# Patient Record
Sex: MALE | Race: WHITE | NOT HISPANIC OR LATINO | URBAN - METROPOLITAN AREA
[De-identification: names, ages, dates, MRNs, and addresses within clinical notes are randomized per-mention and may not be internally consistent; named-entity substitution may affect disease eponyms.]

---

## 2023-01-26 PROBLEM — I10 BENIGN ESSENTIAL HYPERTENSION: Status: ACTIVE | Noted: 2023-01-26

## 2023-01-26 PROBLEM — E66.9 CLASS 1 OBESITY WITH BODY MASS INDEX (BMI) OF 31.0 TO 31.9 IN ADULT: Status: ACTIVE | Noted: 2023-01-26

## 2023-01-26 PROBLEM — E78.2 HYPERLIPIDEMIA, MIXED: Status: ACTIVE | Noted: 2023-01-26

## 2023-01-26 PROBLEM — D03.9: Status: ACTIVE | Noted: 2023-01-26

## 2023-01-26 PROBLEM — E66.811 CLASS 1 OBESITY WITH BODY MASS INDEX (BMI) OF 31.0 TO 31.9 IN ADULT: Status: ACTIVE | Noted: 2023-01-26

## 2023-01-26 PROBLEM — R80.9 PROTEINURIA: Status: ACTIVE | Noted: 2023-01-26

## 2023-01-26 RX ORDER — CHOLECALCIFEROL (VITAMIN D3) 25 MCG
TABLET ORAL
COMMUNITY
Start: 2021-02-01

## 2023-01-26 RX ORDER — TRANDOLAPRIL 1 MG/1
1 TABLET ORAL EVERY EVENING
COMMUNITY
Start: 2016-10-10 | End: 2023-11-17 | Stop reason: SDUPTHER

## 2023-01-26 RX ORDER — ROSUVASTATIN CALCIUM 20 MG/1
1 TABLET, COATED ORAL DAILY
COMMUNITY
Start: 2018-06-19 | End: 2023-11-17 | Stop reason: SDUPTHER

## 2023-01-26 RX ORDER — FOLIC ACID 0.4 MG
TABLET ORAL
COMMUNITY
Start: 2016-06-07

## 2023-01-26 RX ORDER — ASPIRIN 81 MG/1
1 TABLET ORAL DAILY
COMMUNITY
Start: 2014-07-08

## 2023-01-26 RX ORDER — VALACYCLOVIR HYDROCHLORIDE 1 G/1
1 TABLET, FILM COATED ORAL DAILY
COMMUNITY
Start: 2022-08-25 | End: 2023-02-03

## 2023-01-26 RX ORDER — MULTIVIT-MIN/IRON/FOLIC ACID/K 18-600-40
CAPSULE ORAL
COMMUNITY
Start: 2021-02-01

## 2023-01-26 RX ORDER — EPINEPHRINE 0.22MG
AEROSOL WITH ADAPTER (ML) INHALATION
COMMUNITY
Start: 2021-02-01

## 2023-02-03 RX ORDER — MULTIVITAMIN
1 TABLET ORAL DAILY
COMMUNITY

## 2023-03-09 ENCOUNTER — OFFICE VISIT (OUTPATIENT)
Dept: PRIMARY CARE | Facility: CLINIC | Age: 53
End: 2023-03-09
Payer: COMMERCIAL

## 2023-03-09 ENCOUNTER — APPOINTMENT (OUTPATIENT)
Dept: LAB | Facility: LAB | Age: 53
End: 2023-03-09
Payer: COMMERCIAL

## 2023-03-09 VITALS
HEIGHT: 68 IN | HEART RATE: 61 BPM | DIASTOLIC BLOOD PRESSURE: 71 MMHG | TEMPERATURE: 97.3 F | OXYGEN SATURATION: 97 % | BODY MASS INDEX: 30.01 KG/M2 | SYSTOLIC BLOOD PRESSURE: 114 MMHG | WEIGHT: 198 LBS

## 2023-03-09 DIAGNOSIS — R80.1 PERSISTENT PROTEINURIA: ICD-10-CM

## 2023-03-09 DIAGNOSIS — E66.09 CLASS 1 OBESITY DUE TO EXCESS CALORIES WITHOUT SERIOUS COMORBIDITY WITH BODY MASS INDEX (BMI) OF 31.0 TO 31.9 IN ADULT: ICD-10-CM

## 2023-03-09 DIAGNOSIS — D03.4 MELANOMA IN SITU OF SCALP (MULTI): ICD-10-CM

## 2023-03-09 DIAGNOSIS — E78.2 HYPERLIPIDEMIA, MIXED: ICD-10-CM

## 2023-03-09 DIAGNOSIS — I10 BENIGN ESSENTIAL HYPERTENSION: Primary | ICD-10-CM

## 2023-03-09 PROCEDURE — 1036F TOBACCO NON-USER: CPT | Performed by: INTERNAL MEDICINE

## 2023-03-09 PROCEDURE — 3078F DIAST BP <80 MM HG: CPT | Performed by: INTERNAL MEDICINE

## 2023-03-09 PROCEDURE — 99212 OFFICE O/P EST SF 10 MIN: CPT | Performed by: INTERNAL MEDICINE

## 2023-03-09 PROCEDURE — 3008F BODY MASS INDEX DOCD: CPT | Performed by: INTERNAL MEDICINE

## 2023-03-09 PROCEDURE — 83036 HEMOGLOBIN GLYCOSYLATED A1C: CPT

## 2023-03-09 PROCEDURE — 36415 COLL VENOUS BLD VENIPUNCTURE: CPT

## 2023-03-09 PROCEDURE — 80061 LIPID PANEL: CPT

## 2023-03-09 PROCEDURE — 3074F SYST BP LT 130 MM HG: CPT | Performed by: INTERNAL MEDICINE

## 2023-03-09 PROCEDURE — 80053 COMPREHEN METABOLIC PANEL: CPT

## 2023-03-09 ASSESSMENT — ENCOUNTER SYMPTOMS
FATIGUE: 0
COUGH: 0
NAUSEA: 0
DIARRHEA: 0
VOMITING: 0
SORE THROAT: 0
FEVER: 0
WHEEZING: 0
CHILLS: 0
PALPITATIONS: 0
SHORTNESS OF BREATH: 0
CONSTIPATION: 0

## 2023-03-09 NOTE — PROGRESS NOTES
Subjective   Patient ID: Alcon Covarrubias is a 52 y.o. male who presents for Establish Care (4 month Follow up).    Assessment/Plan   Problem List Items Addressed This Visit          Circulatory    Benign essential hypertension - Primary     Advised continue aspirin vitamin C vitamin D Mavik 1 mg a day watch for potassium         Relevant Orders    Comprehensive Metabolic Panel    Hemoglobin A1C    Lipid Panel       Endocrine/Metabolic    Class 1 obesity with body mass index (BMI) of 31.0 to 31.9 in adult     Diet exercise sleep apnea test follow-up         Relevant Orders    Comprehensive Metabolic Panel    Hemoglobin A1C    Lipid Panel       Other    Hyperlipidemia, mixed     Low-fat diet exercise Crestor 20 mg a day check LFT CPK         Relevant Orders    Comprehensive Metabolic Panel    Hemoglobin A1C    Lipid Panel    Melanoma in situ, unspecified (CMS/HCC)     Once a year dermatology evaluation         Relevant Orders    Comprehensive Metabolic Panel    Hemoglobin A1C    Lipid Panel    Proteinuria     Low-salt low-protein diet         Relevant Orders    Comprehensive Metabolic Panel    Hemoglobin A1C    Lipid Panel     Family history of heart disease and colon cancer advised to follow-up with the cardiologist and gastroenterologist sent for the blood test  Source of history: Nurse, Medical personnel, Medical record, Patient.  History limitation: None.    HPI history of hypertension hyperlipidemia obesity proteinuria    No headache no chest pain no hematuria rectal bleeding negative for COVID-19    No Known Allergies    Current Outpatient Medications   Medication Sig Dispense Refill    ascorbic acid, vitamin C, 500 mg capsule Vitamin C 500 MG Oral Capsule   Refills: 0        [facility] GREGG, Mark   Start : 1-Feb-2021   Active      aspirin 81 mg EC tablet Take 1 tablet (81 mg) by mouth once daily.      cholecalciferol (Vitamin D-3) 25 MCG (1000 UT) tablet Vitamin D3 25 MCG (1000 UT) Oral Tablet   Refills: 0       "  Mark Petersen MD   Start : 1-Feb-2021   Active      coenzyme Q-10 100 mg capsule CoQ10 100 MG Oral Capsule   Refills: 0        Mark Petersen MD   Start : 1-Feb-2021   Active      folic acid (Folvite) 400 mcg tablet Folic Acid 400 MCG Oral Tablet   Refills: 0        Mark Petersen MD   Start : 7-Jun-2016   Active      multivitamin tablet Take 1 tablet by mouth once daily.      omega-3/dha/epa/fish oil (OMEGA-3 ORAL) MegaRed Omega-3 Krill Oil 350 MG Oral Capsule      rosuvastatin (Crestor) 20 mg tablet Take 1 tablet (20 mg) by mouth once daily. In the pm      trandolapril (Mavik) 1 mg tablet Take 1 tablet (1 mg) by mouth once daily in the evening. In the pm       No current facility-administered medications for this visit.       Objective   Visit Vitals  /71 (BP Location: Left arm, Patient Position: Sitting, BP Cuff Size: Large adult)   Pulse 61   Temp 36.3 °C (97.3 °F)   Ht 1.727 m (5' 8\")   Wt 89.8 kg (198 lb)   SpO2 97%   BMI 30.11 kg/m²   Smoking Status Former   BSA 2.08 m²     Physical Exam  Constitutional:       General: He is not in acute distress.     Appearance: Normal appearance.   HENT:      Head: Normocephalic and atraumatic.      Nose: Nose normal.   Eyes:      Extraocular Movements: Extraocular movements intact.      Conjunctiva/sclera: Conjunctivae normal.   Cardiovascular:      Rate and Rhythm: Normal rate and regular rhythm.   Pulmonary:      Effort: Pulmonary effort is normal.      Breath sounds: Normal breath sounds.   Skin:     General: Skin is warm.   Neurological:      Mental Status: He is alert and oriented to person, place, and time.   Psychiatric:         Mood and Affect: Mood normal.         Behavior: Behavior normal.       Review of Systems   Constitutional:  Negative for chills, fatigue and fever.   HENT:  Negative for congestion, ear pain and sore throat.    Respiratory:  Negative for cough, shortness of breath and wheezing.    Cardiovascular:  Negative for chest pain, " palpitations and leg swelling.   Gastrointestinal:  Negative for constipation, diarrhea, nausea and vomiting.       No visits with results within 4 Month(s) from this visit.   Latest known visit with results is:   [facility] Encounter on 11/08/2022   Component Date Value Ref Range Status    Color, Urine 11/08/2022 IRMA  STRAW,YELLOW Final    Appearance, Urine 11/08/2022 HAZY  CLEAR Final    Specific Gravity, Urine 11/08/2022 1.026  1.005 - 1.035 Final    pH, Urine 11/08/2022 5.0  5.0 - 8.0 Final    Protein, Urine 11/08/2022 NEGATIVE  NEGATIVE mg/dL Final    Glucose, Urine 11/08/2022 NEGATIVE  NEGATIVE mg/dL Final    Blood, Urine 11/08/2022 NEGATIVE  NEGATIVE Final    Ketones, Urine 11/08/2022 NEGATIVE  NEGATIVE mg/dL Final    Bilirubin, Urine 11/08/2022 NEGATIVE  NEGATIVE Final    Urobilinogen, Urine 11/08/2022 <2.0  0.0 - 1.9 mg/dL Final    Nitrite, Urine 11/08/2022 NEGATIVE  NEGATIVE Final    Leukocyte Esterase, Urine 11/08/2022 NEGATIVE  NEGATIVE Final       Radiology: Reviewed imaging in powerchart.  No results found.    Family History   Problem Relation Name Age of Onset    Liver cancer Father      Kidney failure Father      Other (cardiac disorder) Father       Social History     Socioeconomic History    Marital status:      Spouse name: None    Number of children: None    Years of education: None    Highest education level: None   Occupational History    None   Tobacco Use    Smoking status: Former     Types: Cigarettes    Smokeless tobacco: Never   Vaping Use    Vaping status: None   Substance and Sexual Activity    Alcohol use: Yes     Alcohol/week: 1.0 standard drink of alcohol     Types: 1 Shots of liquor per week    Drug use: Never    Sexual activity: None   Other Topics Concern    None   Social History Narrative    None     Social Determinants of Health     Financial Resource Strain: Not on file   Food Insecurity: Not on file   Transportation Needs: Not on file   Physical Activity: Not on file    Stress: Not on file   Social Connections: Not on file   Intimate Partner Violence: Not on file   Housing Stability: Not on file     Past Medical History:   Diagnosis Date    Abnormal levels of other serum enzymes 06/19/2018    Elevated CPK    Body mass index (BMI) 32.0-32.9, adult 03/17/2022    BMI 32.0-32.9,adult    Contact with and (suspected) exposure to covid-19 12/03/2020    COVID-19 ruled out by laboratory testing    COVID-19 07/12/2021    Lab test positive for detection of COVID-19 virus    Encounter for screening for malignant neoplasm of colon 08/25/2022    Screen for colon cancer    Encounter for screening for malignant neoplasm of rectum 08/25/2022    Screening for rectal cancer    Encounter for screening for malignant neoplasm of respiratory organs 08/25/2022    Encounter for screening for lung cancer    Other chest pain 05/16/2019    Atypical chest pain    Other long term (current) drug therapy 06/15/2020    Long term use of drug    Otitis media, unspecified, unspecified ear 07/08/2015    Ear infection    Pain in left shoulder 02/26/2018    Chronic left shoulder pain    Periapical abscess without sinus 03/13/2020    Tooth infection    Personal history of malignant melanoma of skin     History of malignant melanoma of skin    Personal history of other diseases of the musculoskeletal system and connective tissue 01/19/2015    History of low back pain    Personal history of other endocrine, nutritional and metabolic disease 06/15/2020    History of vitamin D deficiency    Personal history of other endocrine, nutritional and metabolic disease 06/15/2020    History of morbid obesity    Personal history of other infectious and parasitic diseases 08/25/2022    History of herpes labialis    Personal history of urinary (tract) infections 07/16/2019    History of urinary tract infection     Past Surgical History:   Procedure Laterality Date    APPENDECTOMY  07/08/2014    Appendectomy       Charting was  completed using voice recognition technology and may include unintended errors.

## 2023-03-10 LAB
ALANINE AMINOTRANSFERASE (SGPT) (U/L) IN SER/PLAS: 22 U/L (ref 10–52)
ALBUMIN (G/DL) IN SER/PLAS: 4.4 G/DL (ref 3.4–5)
ALKALINE PHOSPHATASE (U/L) IN SER/PLAS: 56 U/L (ref 33–120)
ANION GAP IN SER/PLAS: 14 MMOL/L (ref 10–20)
ASPARTATE AMINOTRANSFERASE (SGOT) (U/L) IN SER/PLAS: 20 U/L (ref 9–39)
BILIRUBIN TOTAL (MG/DL) IN SER/PLAS: 0.8 MG/DL (ref 0–1.2)
CALCIUM (MG/DL) IN SER/PLAS: 9.3 MG/DL (ref 8.6–10.6)
CARBON DIOXIDE, TOTAL (MMOL/L) IN SER/PLAS: 26 MMOL/L (ref 21–32)
CHLORIDE (MMOL/L) IN SER/PLAS: 105 MMOL/L (ref 98–107)
CHOLESTEROL (MG/DL) IN SER/PLAS: 155 MG/DL (ref 0–199)
CHOLESTEROL IN HDL (MG/DL) IN SER/PLAS: 41.5 MG/DL
CHOLESTEROL/HDL RATIO: 3.7
CREATININE (MG/DL) IN SER/PLAS: 1.14 MG/DL (ref 0.5–1.3)
ESTIMATED AVERAGE GLUCOSE FOR HBA1C: 108 MG/DL
GFR MALE: 77 ML/MIN/1.73M2
GLUCOSE (MG/DL) IN SER/PLAS: 93 MG/DL (ref 74–99)
HEMOGLOBIN A1C/HEMOGLOBIN TOTAL IN BLOOD: 5.4 %
LDL: 96 MG/DL (ref 0–99)
POTASSIUM (MMOL/L) IN SER/PLAS: 4.4 MMOL/L (ref 3.5–5.3)
PROTEIN TOTAL: 6.9 G/DL (ref 6.4–8.2)
SODIUM (MMOL/L) IN SER/PLAS: 141 MMOL/L (ref 136–145)
TRIGLYCERIDE (MG/DL) IN SER/PLAS: 88 MG/DL (ref 0–149)
UREA NITROGEN (MG/DL) IN SER/PLAS: 19 MG/DL (ref 6–23)
VLDL: 18 MG/DL (ref 0–40)

## 2023-07-14 ENCOUNTER — OFFICE VISIT (OUTPATIENT)
Dept: PRIMARY CARE | Facility: CLINIC | Age: 53
End: 2023-07-14
Payer: COMMERCIAL

## 2023-07-14 ENCOUNTER — LAB (OUTPATIENT)
Dept: LAB | Facility: LAB | Age: 53
End: 2023-07-14
Payer: COMMERCIAL

## 2023-07-14 VITALS
HEART RATE: 62 BPM | SYSTOLIC BLOOD PRESSURE: 120 MMHG | BODY MASS INDEX: 29.92 KG/M2 | WEIGHT: 197.4 LBS | DIASTOLIC BLOOD PRESSURE: 74 MMHG | HEIGHT: 68 IN | OXYGEN SATURATION: 98 % | TEMPERATURE: 97.3 F

## 2023-07-14 DIAGNOSIS — E78.2 HYPERLIPIDEMIA, MIXED: ICD-10-CM

## 2023-07-14 DIAGNOSIS — R80.1 PERSISTENT PROTEINURIA: ICD-10-CM

## 2023-07-14 DIAGNOSIS — M25.521 RIGHT ELBOW PAIN: ICD-10-CM

## 2023-07-14 DIAGNOSIS — E66.09 CLASS 1 OBESITY DUE TO EXCESS CALORIES WITHOUT SERIOUS COMORBIDITY WITH BODY MASS INDEX (BMI) OF 31.0 TO 31.9 IN ADULT: ICD-10-CM

## 2023-07-14 DIAGNOSIS — Z00.01 ANNUAL VISIT FOR GENERAL ADULT MEDICAL EXAMINATION WITH ABNORMAL FINDINGS: Primary | ICD-10-CM

## 2023-07-14 DIAGNOSIS — I10 BENIGN ESSENTIAL HYPERTENSION: ICD-10-CM

## 2023-07-14 DIAGNOSIS — D03.4 MELANOMA IN SITU OF SCALP (MULTI): ICD-10-CM

## 2023-07-14 DIAGNOSIS — I10 HYPERTENSION, UNSPECIFIED TYPE: ICD-10-CM

## 2023-07-14 LAB
ALANINE AMINOTRANSFERASE (SGPT) (U/L) IN SER/PLAS: 21 U/L (ref 10–52)
ALBUMIN (G/DL) IN SER/PLAS: 4.4 G/DL (ref 3.4–5)
ALBUMIN (MG/L) IN URINE: <7 MG/L
ALBUMIN/CREATININE (UG/MG) IN URINE: NORMAL UG/MG CRT (ref 0–30)
ALKALINE PHOSPHATASE (U/L) IN SER/PLAS: 60 U/L (ref 33–120)
ANION GAP IN SER/PLAS: 11 MMOL/L (ref 10–20)
ASPARTATE AMINOTRANSFERASE (SGOT) (U/L) IN SER/PLAS: 21 U/L (ref 9–39)
BASOPHILS (10*3/UL) IN BLOOD BY AUTOMATED COUNT: 0.04 X10E9/L (ref 0–0.1)
BASOPHILS/100 LEUKOCYTES IN BLOOD BY AUTOMATED COUNT: 0.4 % (ref 0–2)
BILIRUBIN TOTAL (MG/DL) IN SER/PLAS: 0.8 MG/DL (ref 0–1.2)
CALCIUM (MG/DL) IN SER/PLAS: 9.5 MG/DL (ref 8.6–10.6)
CARBON DIOXIDE, TOTAL (MMOL/L) IN SER/PLAS: 27 MMOL/L (ref 21–32)
CHLORIDE (MMOL/L) IN SER/PLAS: 101 MMOL/L (ref 98–107)
CHOLESTEROL (MG/DL) IN SER/PLAS: 152 MG/DL (ref 0–199)
CHOLESTEROL IN HDL (MG/DL) IN SER/PLAS: 44.6 MG/DL
CHOLESTEROL/HDL RATIO: 3.4
CREATININE (MG/DL) IN SER/PLAS: 1.12 MG/DL (ref 0.5–1.3)
CREATININE (MG/DL) IN URINE: 35.8 MG/DL (ref 20–370)
EOSINOPHILS (10*3/UL) IN BLOOD BY AUTOMATED COUNT: 0.09 X10E9/L (ref 0–0.7)
EOSINOPHILS/100 LEUKOCYTES IN BLOOD BY AUTOMATED COUNT: 1 % (ref 0–6)
ERYTHROCYTE DISTRIBUTION WIDTH (RATIO) BY AUTOMATED COUNT: 12.5 % (ref 11.5–14.5)
ERYTHROCYTE MEAN CORPUSCULAR HEMOGLOBIN CONCENTRATION (G/DL) BY AUTOMATED: 32.3 G/DL (ref 32–36)
ERYTHROCYTE MEAN CORPUSCULAR VOLUME (FL) BY AUTOMATED COUNT: 89 FL (ref 80–100)
ERYTHROCYTES (10*6/UL) IN BLOOD BY AUTOMATED COUNT: 4.81 X10E12/L (ref 4.5–5.9)
ESTIMATED AVERAGE GLUCOSE FOR HBA1C: 108 MG/DL
GFR MALE: 78 ML/MIN/1.73M2
GLUCOSE (MG/DL) IN SER/PLAS: 83 MG/DL (ref 74–99)
HEMATOCRIT (%) IN BLOOD BY AUTOMATED COUNT: 42.7 % (ref 41–52)
HEMOGLOBIN (G/DL) IN BLOOD: 13.8 G/DL (ref 13.5–17.5)
HEMOGLOBIN A1C/HEMOGLOBIN TOTAL IN BLOOD: 5.4 %
IMMATURE GRANULOCYTES/100 LEUKOCYTES IN BLOOD BY AUTOMATED COUNT: 0.3 % (ref 0–0.9)
LDL: 93 MG/DL (ref 0–99)
LEUKOCYTES (10*3/UL) IN BLOOD BY AUTOMATED COUNT: 9 X10E9/L (ref 4.4–11.3)
LYMPHOCYTES (10*3/UL) IN BLOOD BY AUTOMATED COUNT: 2.1 X10E9/L (ref 1.2–4.8)
LYMPHOCYTES/100 LEUKOCYTES IN BLOOD BY AUTOMATED COUNT: 23.3 % (ref 13–44)
MONOCYTES (10*3/UL) IN BLOOD BY AUTOMATED COUNT: 0.7 X10E9/L (ref 0.1–1)
MONOCYTES/100 LEUKOCYTES IN BLOOD BY AUTOMATED COUNT: 7.8 % (ref 2–10)
NEUTROPHILS (10*3/UL) IN BLOOD BY AUTOMATED COUNT: 6.07 X10E9/L (ref 1.2–7.7)
NEUTROPHILS/100 LEUKOCYTES IN BLOOD BY AUTOMATED COUNT: 67.2 % (ref 40–80)
NRBC (PER 100 WBCS) BY AUTOMATED COUNT: 0 /100 WBC (ref 0–0)
PLATELETS (10*3/UL) IN BLOOD AUTOMATED COUNT: 350 X10E9/L (ref 150–450)
POTASSIUM (MMOL/L) IN SER/PLAS: 3.7 MMOL/L (ref 3.5–5.3)
PROSTATE SPECIFIC ANTIGEN,SCREEN: 0.46 NG/ML (ref 0–4)
PROTEIN TOTAL: 7.2 G/DL (ref 6.4–8.2)
RBC, URINE: <1 /HPF (ref 0–5)
SODIUM (MMOL/L) IN SER/PLAS: 135 MMOL/L (ref 136–145)
SQUAMOUS EPITHELIAL CELLS, URINE: <1 /HPF
THYROTROPIN (MIU/L) IN SER/PLAS BY DETECTION LIMIT <= 0.05 MIU/L: 3.52 MIU/L (ref 0.44–3.98)
TRIGLYCERIDE (MG/DL) IN SER/PLAS: 73 MG/DL (ref 0–149)
URATE (MG/DL) IN SER/PLAS: 6.7 MG/DL (ref 4–7.5)
UREA NITROGEN (MG/DL) IN SER/PLAS: 19 MG/DL (ref 6–23)
VLDL: 15 MG/DL (ref 0–40)
WBC, URINE: NORMAL /HPF (ref 0–5)

## 2023-07-14 PROCEDURE — 80061 LIPID PANEL: CPT

## 2023-07-14 PROCEDURE — 3074F SYST BP LT 130 MM HG: CPT | Performed by: INTERNAL MEDICINE

## 2023-07-14 PROCEDURE — 99396 PREV VISIT EST AGE 40-64: CPT | Performed by: INTERNAL MEDICINE

## 2023-07-14 PROCEDURE — 36415 COLL VENOUS BLD VENIPUNCTURE: CPT

## 2023-07-14 PROCEDURE — 84550 ASSAY OF BLOOD/URIC ACID: CPT

## 2023-07-14 PROCEDURE — 3078F DIAST BP <80 MM HG: CPT | Performed by: INTERNAL MEDICINE

## 2023-07-14 PROCEDURE — 82043 UR ALBUMIN QUANTITATIVE: CPT

## 2023-07-14 PROCEDURE — 84153 ASSAY OF PSA TOTAL: CPT

## 2023-07-14 PROCEDURE — 3008F BODY MASS INDEX DOCD: CPT | Performed by: INTERNAL MEDICINE

## 2023-07-14 PROCEDURE — 84443 ASSAY THYROID STIM HORMONE: CPT

## 2023-07-14 PROCEDURE — 80053 COMPREHEN METABOLIC PANEL: CPT

## 2023-07-14 PROCEDURE — 82570 ASSAY OF URINE CREATININE: CPT

## 2023-07-14 PROCEDURE — 83036 HEMOGLOBIN GLYCOSYLATED A1C: CPT

## 2023-07-14 PROCEDURE — 85025 COMPLETE CBC W/AUTO DIFF WBC: CPT

## 2023-07-14 PROCEDURE — 1036F TOBACCO NON-USER: CPT | Performed by: INTERNAL MEDICINE

## 2023-07-14 PROCEDURE — 99213 OFFICE O/P EST LOW 20 MIN: CPT | Performed by: INTERNAL MEDICINE

## 2023-07-14 PROCEDURE — 81001 URINALYSIS AUTO W/SCOPE: CPT

## 2023-07-14 RX ORDER — METHYLPREDNISOLONE 4 MG/1
TABLET ORAL
Qty: 21 TABLET | Refills: 0 | Status: SHIPPED | OUTPATIENT
Start: 2023-07-14 | End: 2023-07-21

## 2023-07-14 ASSESSMENT — PATIENT HEALTH QUESTIONNAIRE - PHQ9
2. FEELING DOWN, DEPRESSED OR HOPELESS: NOT AT ALL
SUM OF ALL RESPONSES TO PHQ9 QUESTIONS 1 AND 2: 0
1. LITTLE INTEREST OR PLEASURE IN DOING THINGS: NOT AT ALL

## 2023-07-14 NOTE — ASSESSMENT & PLAN NOTE
53 male advised skin cancer testicular cancer prostate cancer colon cancer screening flu pneumonia COVID-19 vaccines

## 2023-07-14 NOTE — PROGRESS NOTES
Assessment and Plan:  Problem List Items Addressed This Visit          Cardiac and Vasculature    Hypertension    Relevant Orders    CT cardiac scoring wo IV contrast    Hyperlipidemia, mixed    Relevant Orders    Albumin , Urine Random    CBC and Auto Differential    Comprehensive Metabolic Panel    Hemoglobin A1C    Lipid Panel    Prostate Specific Antigen, Screen    TSH with reflex to Free T4 if abnormal    Urinalysis Microscopic Only    Uric Acid       Endocrine/Metabolic    Class 1 obesity with body mass index (BMI) of 31.0 to 31.9 in adult     I spent <15 minutes face to face with individual providing recommendations for nutrition choices and exercise plan to help achieve weight reduction goals. Obesity is systemic disorder and it can bring devastating morbidities in furture. It is a matter of calorie gain and loss, keeping bodybank in negative calorie balance mode is the way to sustain weight loss.Diet has a big role in reducing excess body wt. Scheduled and well planned meals and food intake with watchfulness and understanding of calorie portion and distribution is key to understand. Bariatric surgery is another option if sustained wt loss is not achieved and faced with one or more comorbidities with morbid obesity. Weigh yourself twice a week to understand and follow wt loss goals.           Relevant Orders    Albumin , Urine Random    CBC and Auto Differential    Comprehensive Metabolic Panel    Hemoglobin A1C    Lipid Panel    Prostate Specific Antigen, Screen    TSH with reflex to Free T4 if abnormal    Urinalysis Microscopic Only    Uric Acid       Genitourinary and Reproductive    Proteinuria    Relevant Orders    Albumin , Urine Random    CBC and Auto Differential    Comprehensive Metabolic Panel    Hemoglobin A1C    Lipid Panel    Prostate Specific Antigen, Screen    TSH with reflex to Free T4 if abnormal    Urinalysis Microscopic Only    Uric Acid       Health Encounters    Annual visit for general  adult medical examination with abnormal findings - Primary     53 male advised skin cancer testicular cancer prostate cancer colon cancer screening flu pneumonia COVID-19 vaccines            Hematology and Neoplasia    Melanoma in situ, unspecified (CMS/HCC)     Dermatology evaluation once a year         Relevant Orders    Albumin , Urine Random    CBC and Auto Differential    Comprehensive Metabolic Panel    Hemoglobin A1C    Lipid Panel    Prostate Specific Antigen, Screen    TSH with reflex to Free T4 if abnormal    Urinalysis Microscopic Only    Uric Acid       Musculoskeletal and Injuries    Right elbow pain         Chief Complaint:   Annual Medicare Wellness Office Exam/Comprehensive Problem Focused Follow Up and Physical Exam      HPI: 53-year-old patient  with 2 children    Brother  from heart disease    Mother have obesity    Father have colon cancer    Personal history of obesity hypertension hyperlipidemia gastritis    Had a posttraumatic injury to the right elbow source of the tingling numbness    Negative for jaundice hematuria rectal bleeding weight loss    Negative for headache or chest pain    Negative for COVID-19 or fall        Patient Active Problem List:  Patient Active Problem List   Diagnosis    Hypertension    Hyperlipidemia, mixed    Melanoma in situ, unspecified (CMS/HCC)    Proteinuria    Class 1 obesity with body mass index (BMI) of 31.0 to 31.9 in adult    Annual visit for general adult medical examination with abnormal findings    Right elbow pain          Comprehensive Medical/Surgical/Social/Family History:  Family History   Problem Relation Name Age of Onset    No Known Problems Mother      Liver cancer Father      Kidney failure Father      Other (cardiac disorder) Father         Past Medical History:   Diagnosis Date    Abnormal levels of other serum enzymes 2018    Elevated CPK    Body mass index (BMI) 32.0-32.9, adult 2022    BMI 32.0-32.9,adult    Contact  with and (suspected) exposure to covid-19 12/03/2020    COVID-19 ruled out by laboratory testing    COVID-19 07/12/2021    Lab test positive for detection of COVID-19 virus    Encounter for screening for malignant neoplasm of colon 08/25/2022    Screen for colon cancer    Encounter for screening for malignant neoplasm of rectum 08/25/2022    Screening for rectal cancer    Encounter for screening for malignant neoplasm of respiratory organs 08/25/2022    Encounter for screening for lung cancer    Other chest pain 05/16/2019    Atypical chest pain    Other long term (current) drug therapy 06/15/2020    Long term use of drug    Otitis media, unspecified, unspecified ear 07/08/2015    Ear infection    Pain in left shoulder 02/26/2018    Chronic left shoulder pain    Periapical abscess without sinus 03/13/2020    Tooth infection    Personal history of malignant melanoma of skin     History of malignant melanoma of skin    Personal history of other diseases of the musculoskeletal system and connective tissue 01/19/2015    History of low back pain    Personal history of other endocrine, nutritional and metabolic disease 06/15/2020    History of vitamin D deficiency    Personal history of other endocrine, nutritional and metabolic disease 06/15/2020    History of morbid obesity    Personal history of other infectious and parasitic diseases 08/25/2022    History of herpes labialis    Personal history of urinary (tract) infections 07/16/2019    History of urinary tract infection       Past Surgical History:   Procedure Laterality Date    APPENDECTOMY  07/08/2014    Appendectomy    MOUTH SURGERY      tooth implant       Social History     Socioeconomic History    Marital status:      Spouse name: None    Number of children: None    Years of education: None    Highest education level: None   Occupational History    None   Tobacco Use    Smoking status: Former     Types: Cigarettes    Smokeless tobacco: Never   Substance  and Sexual Activity    Alcohol use: Yes     Alcohol/week: 1.0 standard drink of alcohol     Types: 1 Shots of liquor per week    Drug use: Never    Sexual activity: None   Other Topics Concern    None   Social History Narrative    None     Social Determinants of Health     Financial Resource Strain: Not on file   Food Insecurity: Not on file   Transportation Needs: Not on file   Physical Activity: Not on file   Stress: Not on file   Social Connections: Not on file   Intimate Partner Violence: Not on file   Housing Stability: Not on file       Tobacco/Alcohol/Opioid use, as well as Illicit Drug Use was screened for/reviewed and documented in Social Documentation section of the chart and medication list as appropriate    Allergies and Medications  No Known Allergies  Current Outpatient Medications   Medication Sig Dispense Refill    ascorbic acid, vitamin C, 500 mg capsule Vitamin C 500 MG Oral Capsule   Refills: 0        Mark Petersen MD   Start : 1-Feb-2021   Active      aspirin 81 mg EC tablet Take 1 tablet (81 mg) by mouth once daily.      cholecalciferol (Vitamin D-3) 25 MCG (1000 UT) tablet Vitamin D3 25 MCG (1000 UT) Oral Tablet   Refills: 0        Mark Petersen MD   Start : 1-Feb-2021   Active      coenzyme Q-10 100 mg capsule CoQ10 100 MG Oral Capsule   Refills: 0        Mark Petersen MD   Start : 1-Feb-2021   Active      folic acid (Folvite) 400 mcg tablet Folic Acid 400 MCG Oral Tablet   Refills: 0        Mark Petersen MD   Start : 7-Jun-2016   Active      multivitamin tablet Take 1 tablet by mouth once daily.      omega-3/dha/epa/fish oil (OMEGA-3 ORAL) MegaRed Omega-3 Krill Oil 350 MG Oral Capsule      rosuvastatin (Crestor) 20 mg tablet Take 1 tablet (20 mg) by mouth once daily. In the pm      trandolapril (Mavik) 1 mg tablet Take 1 tablet (1 mg) by mouth once daily in the evening. In the pm       No current facility-administered medications for this visit.       Medications and Supplements   "prescribed by me and other practitioners or clinical pharmacist (such as prescriptions, OTC's, herbal therapies and supplements) were reviewed and documented in the medical record.     Advance directives  Advanced Care Planning (including a Living Will, Healthcare POA, as well as specific end of life choices and/or directives), was discussed for approximately 16 minutes with the patient and/or surrogate, voluntarily, and documented in the Problem List of the medical record.     Cardiac Risk Assessment  Cardiovascular risk was discussed and, if needed, lifestyle modifications recommended, including nutritional choices, exercise, and elimination of habits contributing to risk. We agreed on a plan to reduce the current cardiovascular risk based on above discussion as needed.  Aspirin use/disuse was discussed and documented in the Problem List of the medical record after reviewing the updated guidelines below:    Consider low dose Aspirin ( mg) use if the benefit for cardiovascular disease prevention outweighs risk for bleeding complications.   In general, low dose ASA should be considered:  In patients WITHOUT prior MI/stroke/PAD (primary prevention):   a. Age <60: Use if 10-year cardiovascular disease risk >20%, with discussion of risks and benefits with patient  b. Age 60-<70: Use if 10-year cardiovascular disease risk >20% and low bleeding (e.g., gastrointenstinal) risk, with discussion of risks and benefits with patient  c. Age >=70: Do not use    In patients WITH prior MI/stroke/PAD (secondary prevention):   Generally use unless extremely high bleeding (e.g., gastrointenstinal) risk, with discussion of risks and benefits with patient    ROS otherwise negative aside from what was mentioned above in HPI.    Visit Vitals  /74 (BP Location: Left arm, Patient Position: Sitting, BP Cuff Size: Large adult)   Pulse 62   Temp 36.3 °C (97.3 °F)   Ht 1.727 m (5' 8\")   Wt 89.5 kg (197 lb 6.4 oz)   SpO2 98%   BMI " 30.01 kg/m²   Smoking Status Former   BSA 2.07 m²       Physical Exam      Physical Exam:    Appearance: Alert, oriented , cooperative,  in no acute distress. Well nourished & well hydrated.    Skin: Intact,  dry skin, no lesions, rash, petechiae or purpura.     Eyes: PERRLA, EOMs intact,  Conjunctiva pink with no redness or exudates. Cornea & anterior chamber are clear, Eyelids without lesions. No scleral icterus.     ENT: Hearing grossly intact. External auditory canals patent, tympanic membranes intact with visible landmarks. Nares patent, mucus membranes moist. Dentition without lesions. Pharynx clear, uvula midline.     Neck: Supple, without meningismus. Thyroid not palpable. Trachea at midline. No lymphadenopathy.    Pulmonary: Clear bilaterally with good chest wall excursion. No rales, rhonchi or wheezing. No accessory muscle use or stridor.    Cardiac: Normal S1, S2 without murmur, rub, gallop or extrasystole. No JVD, Carotids without bruits.    Abdomen: Soft, nontender, active bowel sounds.  No palpable organomegaly.  No rebound or guarding.  No CVA tenderness.    Genitourinary: Exam deferred.    Musculoskeletal: Full range of motion. no pain, edema, or deformity. Pulses full and equal. No cyanosis, clubbing, or edema.    Neurological:  Cranial nerves II through XII are grossly intact, finger-nose touch is normal, normal sensation, no weakness, no focal findings identified.    Psychiatric: Appropriate mood and affect.     During the course of the visit the patient was educated and counseled about age appropriate screening and preventive services. Completed preventive screenings were documented in the chart and orders were placed for outstanding screenings/procedures as documented in the Assessment and Plan.    Patient Instructions (the written plan) was given to the patient at check out.    Charting was completed using voice recognition technology and may include unintended errors.

## 2023-07-17 ENCOUNTER — TELEPHONE (OUTPATIENT)
Dept: PRIMARY CARE | Facility: CLINIC | Age: 53
End: 2023-07-17
Payer: COMMERCIAL

## 2023-07-17 NOTE — TELEPHONE ENCOUNTER
----- Message from Mark Petersen MD sent at 7/17/2023  8:35 AM EDT -----  Low-sodium advised cut down water intake drink more juice and follow-up repeat sodium in 3 months

## 2023-11-17 ENCOUNTER — OFFICE VISIT (OUTPATIENT)
Dept: PRIMARY CARE | Facility: CLINIC | Age: 53
End: 2023-11-17
Payer: COMMERCIAL

## 2023-11-17 ENCOUNTER — TELEPHONE (OUTPATIENT)
Dept: PRIMARY CARE | Facility: CLINIC | Age: 53
End: 2023-11-17

## 2023-11-17 VITALS
BODY MASS INDEX: 30.31 KG/M2 | HEART RATE: 72 BPM | TEMPERATURE: 97.1 F | OXYGEN SATURATION: 98 % | SYSTOLIC BLOOD PRESSURE: 103 MMHG | DIASTOLIC BLOOD PRESSURE: 67 MMHG | HEIGHT: 68 IN | WEIGHT: 200 LBS

## 2023-11-17 DIAGNOSIS — D03.4 MELANOMA IN SITU OF SCALP (MULTI): ICD-10-CM

## 2023-11-17 DIAGNOSIS — E66.09 CLASS 1 OBESITY DUE TO EXCESS CALORIES WITH SERIOUS COMORBIDITY AND BODY MASS INDEX (BMI) OF 31.0 TO 31.9 IN ADULT: Primary | ICD-10-CM

## 2023-11-17 DIAGNOSIS — E78.2 HYPERLIPIDEMIA, MIXED: ICD-10-CM

## 2023-11-17 DIAGNOSIS — I10 HYPERTENSION, UNSPECIFIED TYPE: ICD-10-CM

## 2023-11-17 PROBLEM — Z00.01 ANNUAL VISIT FOR GENERAL ADULT MEDICAL EXAMINATION WITH ABNORMAL FINDINGS: Status: RESOLVED | Noted: 2023-07-14 | Resolved: 2023-11-17

## 2023-11-17 PROBLEM — R80.9 PROTEINURIA: Status: RESOLVED | Noted: 2023-01-26 | Resolved: 2023-11-17

## 2023-11-17 PROBLEM — M25.521 RIGHT ELBOW PAIN: Status: RESOLVED | Noted: 2023-07-14 | Resolved: 2023-11-17

## 2023-11-17 LAB
NON-UH HIE A/G RATIO: 1.4
NON-UH HIE ALB: 4.2 G/DL (ref 3.4–5)
NON-UH HIE ALK PHOS: 66 UNIT/L (ref 45–117)
NON-UH HIE BILIRUBIN, TOTAL: 0.7 MG/DL (ref 0.3–1.2)
NON-UH HIE BUN/CREAT RATIO: 15
NON-UH HIE BUN: 18 MG/DL (ref 9–23)
NON-UH HIE CALCIUM: 9.3 MG/DL (ref 8.7–10.4)
NON-UH HIE CALCULATED OSMOLALITY: 277 MOSM/KG (ref 275–295)
NON-UH HIE CHLORIDE: 104 MMOL/L (ref 98–107)
NON-UH HIE CO2, VENOUS: 28 MMOL/L (ref 20–31)
NON-UH HIE CREATININE, URINE MG/DL: 89.2 MG/DL
NON-UH HIE CREATININE: 1.2 MG/DL (ref 0.6–1.1)
NON-UH HIE GFR AA: >60
NON-UH HIE GLOBULIN: 2.9 G/DL
NON-UH HIE GLOMERULAR FILTRATION RATE: >60 ML/MIN/1.73M?
NON-UH HIE GLUCOSE: 87 MG/DL (ref 74–106)
NON-UH HIE GOT: 29 UNIT/L (ref 15–37)
NON-UH HIE GPT: 25 UNIT/L (ref 10–49)
NON-UH HIE K: 4.2 MMOL/L (ref 3.5–5.1)
NON-UH HIE MICROALBUMIN, URINE MG/L: <3 MG/L
NON-UH HIE MICROALBUMIN/CREATININE RATIO: <3 MG MALB/GM CREAT (ref 0–30)
NON-UH HIE NA: 138 MMOL/L (ref 135–145)
NON-UH HIE TOTAL PROTEIN: 7.1 G/DL (ref 5.7–8.2)

## 2023-11-17 PROCEDURE — 3074F SYST BP LT 130 MM HG: CPT | Performed by: INTERNAL MEDICINE

## 2023-11-17 PROCEDURE — 3008F BODY MASS INDEX DOCD: CPT | Performed by: INTERNAL MEDICINE

## 2023-11-17 PROCEDURE — 1036F TOBACCO NON-USER: CPT | Performed by: INTERNAL MEDICINE

## 2023-11-17 PROCEDURE — 3078F DIAST BP <80 MM HG: CPT | Performed by: INTERNAL MEDICINE

## 2023-11-17 PROCEDURE — 99213 OFFICE O/P EST LOW 20 MIN: CPT | Performed by: INTERNAL MEDICINE

## 2023-11-17 RX ORDER — ROSUVASTATIN CALCIUM 20 MG/1
20 TABLET, COATED ORAL DAILY
Qty: 90 TABLET | Refills: 3 | Status: SHIPPED | OUTPATIENT
Start: 2023-11-17

## 2023-11-17 RX ORDER — TRANDOLAPRIL 1 MG/1
1 TABLET ORAL EVERY EVENING
Qty: 90 TABLET | Refills: 3 | Status: SHIPPED | OUTPATIENT
Start: 2023-11-17

## 2023-11-17 NOTE — TELEPHONE ENCOUNTER
----- Message from Mark Petersen MD sent at 11/17/2023 12:07 PM EST -----  Creatinine 1.2 advised low-protein low-salt diet repeat BMP 3 months

## 2023-11-17 NOTE — PROGRESS NOTES
Subjective   Patient ID: Alcon Covarrubias is a 53 y.o. male who presents for Follow-up (Go over last blood work ).    Assessment/Plan     Problem List Items Addressed This Visit       RESOLVED: Hypertension    Relevant Medications    trandolapril (Mavik) 1 mg tablet    Other Relevant Orders    Comprehensive Metabolic Panel    Albumin , Urine Random    Hyperlipidemia, mixed    Relevant Medications    rosuvastatin (Crestor) 20 mg tablet    Other Relevant Orders    Comprehensive Metabolic Panel    Albumin , Urine Random    Melanoma in situ, unspecified (CMS/HCC)     Dermatology referral         Class 1 obesity with body mass index (BMI) of 31.0 to 31.9 in adult - Primary     I spent <15 minutes face to face with individual providing recommendations for nutrition choices and exercise plan to help achieve weight reduction goals. Obesity is systemic disorder and it can bring devastating morbidities in furture. It is a matter of calorie gain and loss, keeping bodybank in negative calorie balance mode is the way to sustain weight loss.Diet has a big role in reducing excess body wt. Scheduled and well planned meals and food intake with watchfulness and understanding of calorie portion and distribution is key to understand. Bariatric surgery is another option if sustained wt loss is not achieved and faced with one or more comorbidities with morbid obesity. Weigh yourself twice a week to understand and follow wt loss goals.            Healthy Lifestyle changes to help lower BP   Stop smoking  Regular exercise  Lose weight  Salt reduction  Healthy diet and drinks  Lower alcohol intake  Home blood pressure monitor and Keep home log to bring into doctors office   New Guidelines: Goal: <130/80, if >79 /80    Lab:  Serum sodiumï¿½potassium and creatinine  Lipid profile and glucose  Urinalysis  Urine spot protein  Twelve-lead EKG  Recommed yearly eye exam    Evaluation  Exclude drug-induced hypertension  Evaluate  for organ  damage  Considered at hisCV risk factors  Cardiovascular risk evaluation   Signs and  symptom of secondary hypertension  Check adherence      Treatment plan  Consider mono-therapy in patients >81 YO or frail  Start with low-dose Ace/Arb + DHP+CCB  Increase to full dose  Add Thiazide like diuretic  Add spiralactone  Yearly eye exam  GOAL= quality of life improvement/blood pressure less than 120/80  Follow up every 3-4 months        Patient was evaluated today, problem list was reviewed, problems and concerns addressed, Rx list reviewed and updated, lab and tests were noted and reviewed. Life style changes were discussed, always it works better if we eat plant based diet and plenty of fibres and roughage. Consume adequate amount of water and avoid alcohol, light to moderate physical activities and stress reduction are always beneficial for ongoing physical well being. Do not forget to have 6 to 7 hours of sleep regularly and avoid late night renate screen exposure.    HPI this is a 53-year-old patient who had history of hypertension hyperlipidemia obesity complaining arthralgia myalgia fatigue tired weakness    Negative for headache chest pain hematuria rectal bleeding or weight loss    Negative for COVID    Negative for depression    Negative for fall  Past Medical History:   Diagnosis Date    Abnormal levels of other serum enzymes 06/19/2018    Elevated CPK    Body mass index (BMI) 32.0-32.9, adult 03/17/2022    BMI 32.0-32.9,adult    Contact with and (suspected) exposure to covid-19 12/03/2020    COVID-19 ruled out by laboratory testing    COVID-19 07/12/2021    Lab test positive for detection of COVID-19 virus    Encounter for screening for malignant neoplasm of colon 08/25/2022    Screen for colon cancer    Encounter for screening for malignant neoplasm of rectum 08/25/2022    Screening for rectal cancer    Encounter for screening for malignant neoplasm of respiratory organs 08/25/2022    Encounter for screening for  lung cancer    Other chest pain 05/16/2019    Atypical chest pain    Other long term (current) drug therapy 06/15/2020    Long term use of drug    Otitis media, unspecified, unspecified ear 07/08/2015    Ear infection    Pain in left shoulder 02/26/2018    Chronic left shoulder pain    Periapical abscess without sinus 03/13/2020    Tooth infection    Personal history of malignant melanoma of skin     History of malignant melanoma of skin    Personal history of other diseases of the musculoskeletal system and connective tissue 01/19/2015    History of low back pain    Personal history of other endocrine, nutritional and metabolic disease 06/15/2020    History of vitamin D deficiency    Personal history of other endocrine, nutritional and metabolic disease 06/15/2020    History of morbid obesity    Personal history of other infectious and parasitic diseases 08/25/2022    History of herpes labialis    Personal history of urinary (tract) infections 07/16/2019    History of urinary tract infection     Past Surgical History:   Procedure Laterality Date    APPENDECTOMY  07/08/2014    Appendectomy    MOUTH SURGERY      tooth implant     No Known Allergies  Current Outpatient Medications   Medication Sig Dispense Refill    ascorbic acid, vitamin C, 500 mg capsule Vitamin C 500 MG Oral Capsule   Refills: 0        Mark Petersen MD   Start : 1-Feb-2021   Active      aspirin 81 mg EC tablet Take 1 tablet (81 mg) by mouth once daily.      cholecalciferol (Vitamin D-3) 25 MCG (1000 UT) tablet Vitamin D3 25 MCG (1000 UT) Oral Tablet   Refills: 0        Mark Petersen MD   Start : 1-Feb-2021   Active      coenzyme Q-10 100 mg capsule CoQ10 100 MG Oral Capsule   Refills: 0        Mark Petersen MD   Start : 1-Feb-2021   Active      folic acid (Folvite) 400 mcg tablet Folic Acid 400 MCG Oral Tablet   Refills: 0        Mark Petersen MD   Start : 7-Jun-2016   Active      multivitamin tablet Take 1 tablet by mouth once daily.       omega-3/dha/epa/fish oil (OMEGA-3 ORAL) MegaRed Omega-3 Krill Oil 350 MG Oral Capsule      rosuvastatin (Crestor) 20 mg tablet Take 1 tablet (20 mg) by mouth once daily. In the pm 90 tablet 3    trandolapril (Mavik) 1 mg tablet Take 1 tablet (1 mg) by mouth once daily in the evening. In the pm 90 tablet 3     No current facility-administered medications for this visit.     Family History   Problem Relation Name Age of Onset    No Known Problems Mother      Liver cancer Father      Kidney failure Father      Other (cardiac disorder) Father       Social History     Socioeconomic History    Marital status:      Spouse name: None    Number of children: None    Years of education: None    Highest education level: None   Occupational History    None   Tobacco Use    Smoking status: Former     Types: Cigarettes    Smokeless tobacco: Never   Substance and Sexual Activity    Alcohol use: Yes     Alcohol/week: 1.0 standard drink of alcohol     Types: 1 Shots of liquor per week    Drug use: Never    Sexual activity: None   Other Topics Concern    None   Social History Narrative    None     Social Determinants of Health     Financial Resource Strain: Not on file   Food Insecurity: Not on file   Transportation Needs: Not on file   Physical Activity: Not on file   Stress: Not on file   Social Connections: Not on file   Intimate Partner Violence: Not on file   Housing Stability: Not on file     Immunization History   Administered Date(s) Administered    Influenza, Unspecified 10/12/2020    Influenza, seasonal, injectable 10/12/2020    Pfizer Purple Cap SARS-CoV-2 03/31/2021, 04/21/2021, 12/11/2021    Zoster vaccine, recombinant, adult (SHINGRIX) 06/15/2020       Review of Systems  Review of systems is otherwise negative unless stated above or in history of present illness.    Objective   Visit Vitals  /67 (BP Location: Left arm, Patient Position: Sitting, BP Cuff Size: Large adult)   Pulse 72   Temp 36.2 °C (97.1  "°F)   Ht 1.727 m (5' 8\")   Wt 90.7 kg (200 lb)   SpO2 98%   BMI 30.41 kg/m²   Smoking Status Former   BSA 2.09 m²     Physical Exam  Constitutional: BMI 30     General: not in acute distress.   HENT:      Head: Normocephalic and atraumatic.      Nose: Nose normal.   Eyes:      Extraocular Movements: Extraocular movements intact.      Conjunctiva/sclera: Conjunctivae normal.   Cardiovascular: Heart murmur     Rate and Rhythm: Normal rate ,  No M/R/G  Pulmonary:      Effort: Pulmonary effort is normal.      Breath sounds: Normal, Bilat Equal AE  Skin:     General: Skin is warm.   Neurological: Neuralgia     Mental Status: He is alert and oriented to person, place, and time.   Psychiatric:         Mood and Affect: Mood normal.         Behavior: Behavior normal.   Musculoskeletal   FROM in all extremitirs,  Joint-no swelling or tenderness    No visits with results within 4 Month(s) from this visit.   Latest known visit with results is:   Lab on 07/14/2023   Component Date Value Ref Range Status    ALBUMIN (MG/L) IN URINE 07/14/2023 <7.0  Not Established mg/L Final    Albumin/Creatine Ratio 07/14/2023 SEE COMMENT  0.0 - 30.0 ug/mg crt Final    Creatinine, Urine 07/14/2023 35.8  20.0 - 370.0 mg/dL Final    WBC 07/14/2023 9.0  4.4 - 11.3 x10E9/L Final    nRBC 07/14/2023 0.0  0.0 - 0.0 /100 WBC Final    RBC 07/14/2023 4.81  4.50 - 5.90 x10E12/L Final    Hemoglobin 07/14/2023 13.8  13.5 - 17.5 g/dL Final    Hematocrit 07/14/2023 42.7  41.0 - 52.0 % Final    MCV 07/14/2023 89  80 - 100 fL Final    MCHC 07/14/2023 32.3  32.0 - 36.0 g/dL Final    Platelets 07/14/2023 350  150 - 450 x10E9/L Final    RDW 07/14/2023 12.5  11.5 - 14.5 % Final    Neutrophils % 07/14/2023 67.2  40.0 - 80.0 % Final    Immature Granulocytes %, Automated 07/14/2023 0.3  0.0 - 0.9 % Final    Lymphocytes % 07/14/2023 23.3  13.0 - 44.0 % Final    Monocytes % 07/14/2023 7.8  2.0 - 10.0 % Final    Eosinophils % 07/14/2023 1.0  0.0 - 6.0 % Final    Basophils " % 07/14/2023 0.4  0.0 - 2.0 % Final    Neutrophils Absolute 07/14/2023 6.07  1.20 - 7.70 x10E9/L Final    Lymphocytes Absolute 07/14/2023 2.10  1.20 - 4.80 x10E9/L Final    Monocytes Absolute 07/14/2023 0.70  0.10 - 1.00 x10E9/L Final    Eosinophils Absolute 07/14/2023 0.09  0.00 - 0.70 x10E9/L Final    Basophils Absolute 07/14/2023 0.04  0.00 - 0.10 x10E9/L Final    Glucose 07/14/2023 83  74 - 99 mg/dL Final    Sodium 07/14/2023 135 (L)  136 - 145 mmol/L Final    Potassium 07/14/2023 3.7  3.5 - 5.3 mmol/L Final    Chloride 07/14/2023 101  98 - 107 mmol/L Final    Bicarbonate 07/14/2023 27  21 - 32 mmol/L Final    Anion Gap 07/14/2023 11  10 - 20 mmol/L Final    Urea Nitrogen 07/14/2023 19  6 - 23 mg/dL Final    Creatinine 07/14/2023 1.12  0.50 - 1.30 mg/dL Final    GFR MALE 07/14/2023 78  >90 mL/min/1.73m2 Final    Calcium 07/14/2023 9.5  8.6 - 10.6 mg/dL Final    Albumin 07/14/2023 4.4  3.4 - 5.0 g/dL Final    Alkaline Phosphatase 07/14/2023 60  33 - 120 U/L Final    Total Protein 07/14/2023 7.2  6.4 - 8.2 g/dL Final    AST 07/14/2023 21  9 - 39 U/L Final    Total Bilirubin 07/14/2023 0.8  0.0 - 1.2 mg/dL Final    ALT (SGPT) 07/14/2023 21  10 - 52 U/L Final    Hemoglobin A1C 07/14/2023 5.4  % Final    Estimated Average Glucose 07/14/2023 108  MG/DL Final    Cholesterol 07/14/2023 152  0 - 199 mg/dL Final    HDL 07/14/2023 44.6  mg/dL Final    Cholesterol/HDL Ratio 07/14/2023 3.4   Final    LDL 07/14/2023 93  0 - 99 mg/dL Final    VLDL 07/14/2023 15  0 - 40 mg/dL Final    Triglycerides 07/14/2023 73  0 - 149 mg/dL Final    Prostate Specific Antigen,Screen 07/14/2023 0.46  0.00 - 4.00 ng/mL Final    TSH 07/14/2023 3.52  0.44 - 3.98 mIU/L Final    WBC, Urine 07/14/2023 None  0 - 5 /HPF Final    RBC, Urine 07/14/2023 <1  0 - 5 /HPF Final    Squamous Epithelial Cells, Urine 07/14/2023 <1  /HPF Final    Uric Acid 07/14/2023 6.7  4.0 - 7.5 mg/dL Final     Sodium 135 advised cut down water intake  Radiology: Reviewed  imaging in powerchart.  No results found.      Charting was completed using voice recognition technology and may include unintended errors.

## 2024-03-21 ENCOUNTER — OFFICE VISIT (OUTPATIENT)
Dept: PRIMARY CARE | Facility: CLINIC | Age: 54
End: 2024-03-21
Payer: COMMERCIAL

## 2024-03-21 VITALS
OXYGEN SATURATION: 98 % | WEIGHT: 195.8 LBS | BODY MASS INDEX: 29.67 KG/M2 | DIASTOLIC BLOOD PRESSURE: 66 MMHG | HEIGHT: 68 IN | HEART RATE: 74 BPM | TEMPERATURE: 97.2 F | SYSTOLIC BLOOD PRESSURE: 106 MMHG

## 2024-03-21 DIAGNOSIS — D03.4 MELANOMA IN SITU OF SCALP (MULTI): ICD-10-CM

## 2024-03-21 DIAGNOSIS — Z11.59 NEED FOR HEPATITIS C SCREENING TEST: ICD-10-CM

## 2024-03-21 DIAGNOSIS — E78.2 HYPERLIPIDEMIA, MIXED: ICD-10-CM

## 2024-03-21 DIAGNOSIS — E78.2 HYPERLIPEMIA, MIXED: ICD-10-CM

## 2024-03-21 DIAGNOSIS — Z11.4 SCREENING FOR HIV WITHOUT PRESENCE OF RISK FACTORS: ICD-10-CM

## 2024-03-21 DIAGNOSIS — Z12.11 COLON CANCER SCREENING: ICD-10-CM

## 2024-03-21 DIAGNOSIS — I10 BENIGN ESSENTIAL HYPERTENSION: Primary | ICD-10-CM

## 2024-03-21 PROBLEM — E66.9 CLASS 1 OBESITY WITH BODY MASS INDEX (BMI) OF 31.0 TO 31.9 IN ADULT: Status: RESOLVED | Noted: 2023-01-26 | Resolved: 2024-03-21

## 2024-03-21 PROBLEM — E66.811 CLASS 1 OBESITY WITH BODY MASS INDEX (BMI) OF 31.0 TO 31.9 IN ADULT: Status: RESOLVED | Noted: 2023-01-26 | Resolved: 2024-03-21

## 2024-03-21 LAB
NON-UH HIE A/G RATIO: 1.2
NON-UH HIE ALB: 3.7 G/DL (ref 3.4–5)
NON-UH HIE ALK PHOS: 80 UNIT/L (ref 45–117)
NON-UH HIE BILIRUBIN, TOTAL: 0.6 MG/DL (ref 0.3–1.2)
NON-UH HIE BUN/CREAT RATIO: 15
NON-UH HIE BUN: 18 MG/DL (ref 9–23)
NON-UH HIE CALCIUM: 9.1 MG/DL (ref 8.7–10.4)
NON-UH HIE CALCULATED OSMOLALITY: 279 MOSM/KG (ref 275–295)
NON-UH HIE CHLORIDE: 107 MMOL/L (ref 98–107)
NON-UH HIE CO2, VENOUS: 26 MMOL/L (ref 20–31)
NON-UH HIE CPK: 277 UNIT/L (ref 46–171)
NON-UH HIE CREATININE, URINE MG/DL: 239.8 MG/DL
NON-UH HIE CREATININE: 1.2 MG/DL (ref 0.6–1.1)
NON-UH HIE GFR AA: >60
NON-UH HIE GLOBULIN: 3.2 G/DL
NON-UH HIE GLOMERULAR FILTRATION RATE: >60 ML/MIN/1.73M?
NON-UH HIE GLUCOSE: 83 MG/DL (ref 74–106)
NON-UH HIE GOT: 44 UNIT/L (ref 15–37)
NON-UH HIE GPT: 22 UNIT/L (ref 10–49)
NON-UH HIE HEPATITIS C ANTIBODY: NONREACTIVE
NON-UH HIE K: 4.6 MMOL/L (ref 3.5–5.1)
NON-UH HIE MICROALBUMIN, URINE MG/L: 6 MG/L
NON-UH HIE MICROALBUMIN/CREATININE RATIO: 2 MG MALB/GM CREAT (ref 0–30)
NON-UH HIE NA: 139 MMOL/L (ref 135–145)
NON-UH HIE TOTAL PROTEIN: 6.9 G/DL (ref 5.7–8.2)

## 2024-03-21 PROCEDURE — 3078F DIAST BP <80 MM HG: CPT | Performed by: INTERNAL MEDICINE

## 2024-03-21 PROCEDURE — 3074F SYST BP LT 130 MM HG: CPT | Performed by: INTERNAL MEDICINE

## 2024-03-21 PROCEDURE — 99214 OFFICE O/P EST MOD 30 MIN: CPT | Performed by: INTERNAL MEDICINE

## 2024-03-21 PROCEDURE — 1036F TOBACCO NON-USER: CPT | Performed by: INTERNAL MEDICINE

## 2024-03-21 PROCEDURE — 3008F BODY MASS INDEX DOCD: CPT | Performed by: INTERNAL MEDICINE

## 2024-03-21 NOTE — PROGRESS NOTES
Subjective   Patient ID: Alcon Covarrubias is a 53 y.o. male who presents for Follow-up (4 month ).    Assessment/Plan     Problem List Items Addressed This Visit       Benign essential hypertension - Primary     Patients BP readings reviewed and addressed, as we age our arteries turn stiffer and less elastic. Restricting salt consumption and staying physically fit with regular exercise regimen is the only way to keep our vasculature less tonic. Studies have shown that keeping ideal body wt, exercise routine about 140 to 150 minutes a week, eating variety of plant based diet and drinking plentiful water are quite helpful. Monitor BP twice or once a week at home and bring log to be reviewed by me. Uncontrolled BP has long term consequences including heart failure, myocardial infarction, accelerated atherosclerosis and kidney dysfunction. Therapy reviewed and explained.           Relevant Orders    Comprehensive Metabolic Panel    Albumin , Urine Random    CK    Hyperlipemia, mixed     LDL goal less than 100 monitor LFT lipid CPK once a year         Relevant Orders    CK    Melanoma in situ, unspecified (CMS/HCC)     Refer to dermatologist         Screening for HIV without presence of risk factors    Relevant Orders    HIV 1/2 Antigen/Antibody Screen with Reflex to Confirmation    Need for hepatitis C screening test    Relevant Orders    Hepatitis C antibody     Patient was evaluated today, problem list was reviewed, problems and concerns addressed, Rx list reviewed and updated, lab and tests were noted and reviewed. Life style changes were discussed, always it works better if we eat plant based diet and plenty of fibres and roughage. Consume adequate amount of water and avoid alcohol, light to moderate physical activities and stress reduction are always beneficial for ongoing physical well being. Do not forget to have 6 to 7 hours of sleep regularly and avoid late night renate screen exposure.    HPI 53-year-old patient  of obesity hypertension hyperlipidemia complaining of nonspecific arthralgia myalgia fatigue tired    Negative for headache chest pain hematuria rectal bleeding or dizziness    Negative for fall or negative for any fever chills or COVID-19 vaccination updated discussed with the patient's advised patient will continue aspirin multivitamin Crestor and Mavik monitor for the CMP and CPK lipid profile    At age of 53 advised to get calcium score for the heart and colonoscopy for the colon cancer prevention given referral to Dr. Hopper cardiologist and Dr. Woo gastroenterologist  Past Medical History:   Diagnosis Date    Abnormal levels of other serum enzymes 06/19/2018    Elevated CPK    Body mass index (BMI) 32.0-32.9, adult 03/17/2022    BMI 32.0-32.9,adult    Contact with and (suspected) exposure to covid-19 12/03/2020    COVID-19 ruled out by laboratory testing    COVID-19 07/12/2021    Lab test positive for detection of COVID-19 virus    Encounter for screening for malignant neoplasm of colon 08/25/2022    Screen for colon cancer    Encounter for screening for malignant neoplasm of rectum 08/25/2022    Screening for rectal cancer    Encounter for screening for malignant neoplasm of respiratory organs 08/25/2022    Encounter for screening for lung cancer    Other chest pain 05/16/2019    Atypical chest pain    Other long term (current) drug therapy 06/15/2020    Long term use of drug    Otitis media, unspecified, unspecified ear 07/08/2015    Ear infection    Pain in left shoulder 02/26/2018    Chronic left shoulder pain    Periapical abscess without sinus 03/13/2020    Tooth infection    Personal history of malignant melanoma of skin     History of malignant melanoma of skin    Personal history of other diseases of the musculoskeletal system and connective tissue 01/19/2015    History of low back pain    Personal history of other endocrine, nutritional and metabolic disease 06/15/2020    History of vitamin D  deficiency    Personal history of other endocrine, nutritional and metabolic disease 06/15/2020    History of morbid obesity    Personal history of other infectious and parasitic diseases 08/25/2022    History of herpes labialis    Personal history of urinary (tract) infections 07/16/2019    History of urinary tract infection     Past Surgical History:   Procedure Laterality Date    APPENDECTOMY  07/08/2014    Appendectomy    MOUTH SURGERY      tooth implant     No Known Allergies  Current Outpatient Medications   Medication Sig Dispense Refill    ascorbic acid, vitamin C, 500 mg capsule Vitamin C 500 MG Oral Capsule   Refills: 0        Mark Petersen MD   Start : 1-Feb-2021   Active      aspirin 81 mg EC tablet Take 1 tablet (81 mg) by mouth once daily.      cholecalciferol (Vitamin D-3) 25 MCG (1000 UT) tablet Vitamin D3 25 MCG (1000 UT) Oral Tablet   Refills: 0        Mark Petersen MD   Start : 1-Feb-2021   Active      coenzyme Q-10 100 mg capsule CoQ10 100 MG Oral Capsule   Refills: 0        Mark Petersen MD   Start : 1-Feb-2021   Active      folic acid (Folvite) 400 mcg tablet Folic Acid 400 MCG Oral Tablet   Refills: 0        Mark Petersen MD   Start : 7-Jun-2016   Active      multivitamin tablet Take 1 tablet by mouth once daily.      omega-3/dha/epa/fish oil (OMEGA-3 ORAL) MegaRed Omega-3 Krill Oil 350 MG Oral Capsule      rosuvastatin (Crestor) 20 mg tablet Take 1 tablet (20 mg) by mouth once daily. In the pm 90 tablet 3    trandolapril (Mavik) 1 mg tablet Take 1 tablet (1 mg) by mouth once daily in the evening. In the pm 90 tablet 3     No current facility-administered medications for this visit.     Family History   Problem Relation Name Age of Onset    No Known Problems Mother      Liver cancer Father      Kidney failure Father      Other (cardiac disorder) Father       Social History     Socioeconomic History    Marital status:      Spouse name: None    Number of children: None     "Years of education: None    Highest education level: None   Occupational History    None   Tobacco Use    Smoking status: Former     Types: Cigarettes    Smokeless tobacco: Never   Substance and Sexual Activity    Alcohol use: Not Currently     Alcohol/week: 0.0 - 3.0 standard drinks of alcohol    Drug use: Never    Sexual activity: None   Other Topics Concern    None   Social History Narrative    None     Social Determinants of Health     Financial Resource Strain: Not on file   Food Insecurity: Not on file   Transportation Needs: Not on file   Physical Activity: Not on file   Stress: Not on file   Social Connections: Not on file   Intimate Partner Violence: Not on file   Housing Stability: Not on file     Immunization History   Administered Date(s) Administered    Influenza, Unspecified 10/12/2020    Influenza, seasonal, injectable 10/12/2020    Pfizer Purple Cap SARS-CoV-2 03/31/2021, 04/21/2021, 12/11/2021    Zoster vaccine, recombinant, adult (SHINGRIX) 06/15/2020       Review of Systems  Review of systems is otherwise negative unless stated above or in history of present illness.    Objective   Visit Vitals  /66 (BP Location: Left arm, Patient Position: Sitting, BP Cuff Size: Large adult)   Pulse 74   Temp 36.2 °C (97.2 °F)   Ht 1.727 m (5' 8\")   Wt 88.8 kg (195 lb 12.8 oz)   SpO2 98%   BMI 29.77 kg/m²   Smoking Status Former   BSA 2.06 m²     Physical Exam  Constitutional: BMI 29     General: not in acute distress.   HENT:      Head: Normocephalic and atraumatic.      Nose: Nose normal.   Eyes:      Extraocular Movements: Extraocular movements intact.      Conjunctiva/sclera: Conjunctivae normal.   Cardiovascular:      Rate and Rhythm: Normal rate ,  No M/R/G  Pulmonary:      Effort: Pulmonary effort is normal.      Breath sounds: Normal, Bilat Equal AE  Skin:     General: Skin is warm.   Neurological:      Mental Status: He is alert and oriented to person, place, and time.   Psychiatric:         Mood " and Affect: Mood normal.         Behavior: Behavior normal.   Musculoskeletal   FROM in all extremitirs,  Joint-no swelling or tenderness    No visits with results within 4 Month(s) from this visit.   Latest known visit with results is:   Orders Only on 11/17/2023   Component Date Value Ref Range Status    [facility] Glomerular Filtration R* 11/17/2023 >60  mL/min/1.73m? Final    [facility] Glucose 11/17/2023 87  74 - 106 mg/dL Final    [facility] Globulin 11/17/2023 2.9  g/dL Final    [facility] Calcium 11/17/2023 9.3  8.7 - 10.4 mg/dL Final    [facility] ALB 11/17/2023 4.2  3.4 - 5.0 g/dL Final    [facility] Chloride 11/17/2023 104  98 - 107 mmol/L Final    [facility] Total Protein 11/17/2023 7.1  5.7 - 8.2 g/dL Final    [facility] BUN 11/17/2023 18  9 - 23 mg/dL Final    [facility] K 11/17/2023 4.2  3.5 - 5.1 mmol/L Final    [facility] Calculated Osmolality 11/17/2023 277  275 - 295 mOsm/kg Final    [facility] Creatinine 11/17/2023 1.2 (H)  0.6 - 1.1 mg/dL Final    [facility] A/G Ratio 11/17/2023 1.4   Final    [facility] GFR AA 11/17/2023 >60   Final    [facility] Alk Phos 11/17/2023 66  45 - 117 unit/L Final    [facility] CO2, venous 11/17/2023 28.0  20.0 - 31.0 mmol/L Final    [facility] GPT 11/17/2023 25  10 - 49 unit/L Final    [facility] BUN/Creat Ratio 11/17/2023 15.0   Final    [facility] Bilirubin, Total 11/17/2023 0.70  0.30 - 1.20 mg/dL Final    [facility] GOT 11/17/2023 29  15 - 37 unit/L Final    [facility] Na 11/17/2023 138  135 - 145 mmol/L Final    [facility] Microalbumin, Urine mg/L 11/17/2023 <3.0  mg/L Final    [facility] Microalbumin/Creatinine* 11/17/2023 <3  0 - 30 mg MALB/gm CREAT Final    [facility] Creatinine, Urine mg/dl 11/17/2023 89.2  mg/dL Final       Radiology: Reviewed imaging in powerchart.  No results found.      Charting was completed using voice recognition technology and may include unintended errors.

## 2024-03-22 ENCOUNTER — TELEPHONE (OUTPATIENT)
Dept: PRIMARY CARE | Facility: CLINIC | Age: 54
End: 2024-03-22
Payer: COMMERCIAL

## 2024-03-22 DIAGNOSIS — I10 BENIGN ESSENTIAL HYPERTENSION: ICD-10-CM

## 2024-03-22 NOTE — TELEPHONE ENCOUNTER
----- Message from Deborah Velázquez MA sent at 3/22/2024  1:32 PM EDT -----    ----- Message -----  From: Mark Petersen MD  Sent: 3/22/2024   1:09 PM EDT  To: Deborah Velázquez MA    Sgot 44  Creatinine 1.2  Cpk 277  Advised low-protein low-salt low carb diet no alcohol repeat BMP CPK 6 weeks drink lots of water

## 2024-03-25 DIAGNOSIS — I10 BENIGN ESSENTIAL HYPERTENSION: ICD-10-CM

## 2024-03-25 LAB — NON-UH HIE MISC SENDOUT: NORMAL

## 2024-04-02 ENCOUNTER — TELEPHONE (OUTPATIENT)
Dept: PRIMARY CARE | Facility: CLINIC | Age: 54
End: 2024-04-02
Payer: COMMERCIAL

## 2024-07-25 ENCOUNTER — APPOINTMENT (OUTPATIENT)
Dept: PRIMARY CARE | Facility: CLINIC | Age: 54
End: 2024-07-25
Payer: COMMERCIAL

## 2024-09-04 ENCOUNTER — TELEPHONE (OUTPATIENT)
Dept: PRIMARY CARE | Facility: CLINIC | Age: 54
End: 2024-09-04
Payer: COMMERCIAL

## 2024-09-04 NOTE — TELEPHONE ENCOUNTER
KWAN YAN  IS SCHEDULED FOR 9/9 .  HE WAS ASKING TO BE DOUBLE BOOKED .  I TOLD HIM I WOULD NEED TO ASK YOU FIRST.  HE SAID THAT IF IT IS A NO THAT'S OKAY    PLEASE LET ME KNOW

## 2024-09-09 ENCOUNTER — APPOINTMENT (OUTPATIENT)
Dept: PRIMARY CARE | Facility: CLINIC | Age: 54
End: 2024-09-09
Payer: COMMERCIAL

## 2024-09-09 ENCOUNTER — DOCUMENTATION (OUTPATIENT)
Dept: PRIMARY CARE | Facility: CLINIC | Age: 54
End: 2024-09-09

## 2024-09-09 VITALS
DIASTOLIC BLOOD PRESSURE: 62 MMHG | SYSTOLIC BLOOD PRESSURE: 110 MMHG | TEMPERATURE: 97 F | HEIGHT: 68 IN | HEART RATE: 84 BPM | OXYGEN SATURATION: 96 % | BODY MASS INDEX: 29.43 KG/M2 | WEIGHT: 194.2 LBS

## 2024-09-09 DIAGNOSIS — Z85.038 HISTORY OF COLON CANCER, STAGE II: ICD-10-CM

## 2024-09-09 DIAGNOSIS — Z09 HOSPITAL DISCHARGE FOLLOW-UP: Primary | ICD-10-CM

## 2024-09-09 DIAGNOSIS — D03.0: ICD-10-CM

## 2024-09-09 DIAGNOSIS — Z90.49 HISTORY OF PARTIAL COLECTOMY: ICD-10-CM

## 2024-09-09 DIAGNOSIS — I82.432 ACUTE DEEP VEIN THROMBOSIS (DVT) OF POPLITEAL VEIN OF LEFT LOWER EXTREMITY (MULTI): ICD-10-CM

## 2024-09-09 DIAGNOSIS — R07.81 PLEURITIC PAIN: ICD-10-CM

## 2024-09-09 DIAGNOSIS — Z86.711 HISTORY OF PULMONARY EMBOLUS (PE): ICD-10-CM

## 2024-09-09 DIAGNOSIS — E78.2 HYPERLIPEMIA, MIXED: ICD-10-CM

## 2024-09-09 PROCEDURE — 99496 TRANSJ CARE MGMT HIGH F2F 7D: CPT | Performed by: INTERNAL MEDICINE

## 2024-09-09 PROCEDURE — 3078F DIAST BP <80 MM HG: CPT | Performed by: INTERNAL MEDICINE

## 2024-09-09 PROCEDURE — 1036F TOBACCO NON-USER: CPT | Performed by: INTERNAL MEDICINE

## 2024-09-09 PROCEDURE — 3008F BODY MASS INDEX DOCD: CPT | Performed by: INTERNAL MEDICINE

## 2024-09-09 PROCEDURE — 3074F SYST BP LT 130 MM HG: CPT | Performed by: INTERNAL MEDICINE

## 2024-09-09 RX ORDER — KETOROLAC TROMETHAMINE 10 MG/1
10 TABLET, FILM COATED ORAL EVERY 8 HOURS PRN
Qty: 10 TABLET | Refills: 0 | Status: SHIPPED | OUTPATIENT
Start: 2024-09-09

## 2024-09-09 RX ORDER — APIXABAN 5 MG (74)
KIT ORAL
COMMUNITY
Start: 2024-09-03

## 2024-09-09 NOTE — PROGRESS NOTES
Subjective   Patient ID: Alcon Covarrubias is a 54 y.o. male who presents for Follow-up (ER follow up/2 in the left leg and in the lung left ) and Arm Pain (Right arm lump where he had an iv ).    Assessment/Plan     Problem List Items Addressed This Visit       Hyperlipemia, mixed     Monitor CMP CPK lipid twice a year         Melanoma in situ, unspecified (Multi)     Dermatology oncology to follow-up         Hospital discharge follow-up - Primary     Face to face visit with patient discuss discharge medication and outpatient medication ceconciliations and answers all questions to patient's and caregiver review hospital record pending diagnostic test and treatment orders to improved coordinate care across the medical community and  referal with provider/service to support patient's health and health related problems to increase patient's satisfaction by reducing risk of readmission I improving And meeting patient's needs advise bring all prescription and nonprescription medication with you.           Acute deep vein thrombosis (DVT) of popliteal vein of left lower extremity (Multi)     Continue Eliquis watch for pain of blood oxygen level         History of pulmonary embolus (PE)     Monitor oxygen and bleeding continue Eliquis         History of colon cancer, stage II    History of partial colectomy     GI surgery follow-up once a year            HPI 54-year-old patient who admitted to the [facility] on September 5 discharged on September 8 admitting diagnosis DVT with pulmonary emboli status [facility] by the pulmonary team at oncology vascular myself stabilized on heparin postoperatively and discharged on [facility]    Review hospital rare and document in the chartAssessment and Plan  Impression and Plan    Assessment plan  Multiple pulmonary emboli  LLE DVT  Stage IIa colon carcinoma status post partial colectomy  Fever unknown  etiology  Hypertension  Hyperlipidemia  Possible pulmonary infarct  Chronic anemia    Multiple pulmonary emboli continue have a pleuritic pain left side advised Toradol and ibuprofen watch for bleeding    Negative for bleeding or hypoxia    Advise vaccination    Annual medical physical    Plan patient discharged home personally call to discuss about long-term anticoagulation use specially with the cancer patient with a DVT and PE we will repeat patient CBC BMP ABG CT scan of the chest in 2 to 3 months  Case discussed with the pulmonary and oncology service     Past Medical History:   Diagnosis Date    Abnormal levels of other serum enzymes 06/19/2018    Elevated CPK    Body mass index (BMI) 32.0-32.9, adult 03/17/2022    BMI 32.0-32.9,adult    Contact with and (suspected) exposure to covid-19 12/03/2020    COVID-19 ruled out by laboratory testing    COVID-19 07/12/2021    Lab test positive for detection of COVID-19 virus    Encounter for screening for malignant neoplasm of colon 08/25/2022    Screen for colon cancer    Encounter for screening for malignant neoplasm of rectum 08/25/2022    Screening for rectal cancer    Encounter for screening for malignant neoplasm of respiratory organs 08/25/2022    Encounter for screening for lung cancer    Other chest pain 05/16/2019    Atypical chest pain    Other long term (current) drug therapy 06/15/2020    Long term use of drug    Otitis media, unspecified, unspecified ear 07/08/2015    Ear infection    Pain in left shoulder 02/26/2018    Chronic left shoulder pain    Periapical abscess without sinus 03/13/2020    Tooth infection    Personal history of malignant melanoma of skin     History of malignant melanoma of skin    Personal history of other diseases of the musculoskeletal system and connective tissue 01/19/2015    History of low back pain    Personal history of other endocrine, nutritional and metabolic disease 06/15/2020    History of vitamin D deficiency     Personal history of other endocrine, nutritional and metabolic disease 06/15/2020    History of morbid obesity    Personal history of other infectious and parasitic diseases 08/25/2022    History of herpes labialis    Personal history of urinary (tract) infections 07/16/2019    History of urinary tract infection     Past Surgical History:   Procedure Laterality Date    APPENDECTOMY  07/08/2014    Appendectomy    MOUTH SURGERY      tooth implant     No Known Allergies  Current Outpatient Medications   Medication Sig Dispense Refill    apixaban (Eliquis DVT-PE Treat 30D Start) 5 mg (74 tabs) tablet See Instructions, take as directed on package, 1 EA, Date: 9/3/24 7:03:00 PM EDT, Instructions Replace Required Details      ascorbic acid, vitamin C, 500 mg capsule Vitamin C 500 MG Oral Capsule   Refills: 0        Mark Petersen MD   Start : 1-Feb-2021   Active      cholecalciferol (Vitamin D-3) 25 MCG (1000 UT) tablet Vitamin D3 25 MCG (1000 UT) Oral Tablet   Refills: 0        Mark Petersen MD   Start : 1-Feb-2021   Active      coenzyme Q-10 100 mg capsule CoQ10 100 MG Oral Capsule   Refills: 0        Mark Petersen MD   Start : 1-Feb-2021   Active      folic acid (Folvite) 400 mcg tablet Folic Acid 400 MCG Oral Tablet   Refills: 0        Mark Petersen MD   Start : 7-Jun-2016   Active      multivitamin tablet Take 1 tablet by mouth once daily.      omega-3/dha/epa/fish oil (OMEGA-3 ORAL) MegaRed Omega-3 Krill Oil 350 MG Oral Capsule      rosuvastatin (Crestor) 20 mg tablet Take 1 tablet (20 mg) by mouth once daily. In the pm 90 tablet 3    trandolapril (Mavik) 1 mg tablet Take 1 tablet (1 mg) by mouth once daily in the evening. In the pm 90 tablet 3     No current facility-administered medications for this visit.     Family History   Problem Relation Name Age of Onset    No Known Problems Mother      Liver cancer Father      Kidney failure Father      Other (cardiac disorder) Father       Social History  "    Socioeconomic History    Marital status:    Tobacco Use    Smoking status: Former     Types: Cigarettes    Smokeless tobacco: Never   Substance and Sexual Activity    Alcohol use: Yes     Alcohol/week: 0.0 - 3.0 standard drinks of alcohol    Drug use: Never     Social Determinants of Health     Food Insecurity: No Food Insecurity (7/26/2024)    Received from [facility]    Hunger Vital Sign     Worried About Running Out of Food in the Last Year: Never true     Ran Out of Food in the Last Year: Never true   Transportation Needs: No Transportation Needs (7/26/2024)    Received from [facility]    PRAPARE - Transportation     Lack of Transportation (Medical): No     Lack of Transportation (Non-Medical): No     Immunization History   Administered Date(s) Administered    Influenza, Unspecified 10/12/2020    Influenza, seasonal, injectable 10/12/2020    Pfizer Purple Cap SARS-CoV-2 03/31/2021, 04/21/2021, 12/11/2021    Zoster vaccine, recombinant, adult (SHINGRIX) 06/15/2020       Review of Systems  Review of systems is otherwise negative unless stated above or in history of present illness.    Objective   Visit Vitals  /62   Pulse 84   Temp 36.1 °C (97 °F)   Ht 1.727 m (5' 8\")   Wt 88.1 kg (194 lb 3.2 oz)   SpO2 96%   BMI 29.53 kg/m²   Smoking Status Former   BSA 2.06 m²     Physical Exam  Constitutional: BMI 29     General: not in acute distress.   HENT:      Head: Normocephalic and atraumatic.      Nose: Nose normal.   Eyes: No jaundice     Extraocular Movements: Extraocular movements intact.      Conjunctiva/sclera: Conjunctivae normal.   Cardiovascular:      Rate and Rhythm: Normal rate ,  No M/R/G  Pulmonary: Decreased air entry crackles left lung     Effort: Pulmonary effort is normal.      Breath sounds: Normal, Bilat Equal AE  Skin:     General: Skin is warm.   Neurological:      Mental Status: He is alert and oriented to person, place, and time.   Psychiatric:    Anxiety     Mood and " Affect: Mood normal.         Behavior: Behavior normal.   Musculoskeletal   FROM in all extremitirs,  Joint-no swelling or tenderness    No visits with results within 4 Month(s) from this visit.   Latest known visit with results is:   Orders Only on 03/25/2024   Component Date Value Ref Range Status    [facility] HIE Misc Sendout 03/25/2024 See Report   Final       Radiology: Reviewed imaging in powerchart.  No results found.      Charting was completed using voice recognition technology and may include unintended errors.

## 2024-09-09 NOTE — PROGRESS NOTES
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

## 2024-09-09 NOTE — ASSESSMENT & PLAN NOTE
Face to face visit with patient discuss discharge medication and outpatient medication ceconciliations and answers all questions to patient's and caregiver review hospital record pending diagnostic test and treatment orders to improved coordinate care across the medical community and  referal with provider/service to support patient's health and health related problems to increase patient's satisfaction by reducing risk of readmission I improving And meeting patient's needs advise bring all prescription and nonprescription medication with you.

## 2024-09-13 ENCOUNTER — PATIENT OUTREACH (OUTPATIENT)
Dept: PRIMARY CARE | Facility: CLINIC | Age: 54
End: 2024-09-13
Payer: COMMERCIAL

## 2024-09-13 NOTE — PROGRESS NOTES
Call after recent hospital discharge.   At time of outreach call the patient feels as if their condition has improved since discharge.  Messaged office per patient request for bloodwork to check iron

## 2024-09-16 DIAGNOSIS — D64.9 ANEMIA, UNSPECIFIED TYPE: ICD-10-CM

## 2024-09-27 ENCOUNTER — TELEPHONE (OUTPATIENT)
Dept: PRIMARY CARE | Facility: CLINIC | Age: 54
End: 2024-09-27
Payer: COMMERCIAL

## 2024-09-27 NOTE — TELEPHONE ENCOUNTER
Pt called back and states thye just got their iron levels checked at [facility] and is wondering if dr castelan would just want him to get his levels rechecked around his November appt

## 2024-10-14 ENCOUNTER — PATIENT OUTREACH (OUTPATIENT)
Dept: PRIMARY CARE | Facility: CLINIC | Age: 54
End: 2024-10-14
Payer: COMMERCIAL

## 2024-11-04 ENCOUNTER — LAB (OUTPATIENT)
Dept: LAB | Facility: LAB | Age: 54
End: 2024-11-04
Payer: COMMERCIAL

## 2024-11-04 ENCOUNTER — APPOINTMENT (OUTPATIENT)
Dept: PRIMARY CARE | Facility: CLINIC | Age: 54
End: 2024-11-04
Payer: COMMERCIAL

## 2024-11-04 VITALS
OXYGEN SATURATION: 98 % | DIASTOLIC BLOOD PRESSURE: 67 MMHG | SYSTOLIC BLOOD PRESSURE: 115 MMHG | WEIGHT: 203 LBS | HEIGHT: 68 IN | TEMPERATURE: 96.9 F | HEART RATE: 58 BPM | BODY MASS INDEX: 30.77 KG/M2

## 2024-11-04 DIAGNOSIS — I10 BENIGN ESSENTIAL HYPERTENSION: ICD-10-CM

## 2024-11-04 DIAGNOSIS — Z00.01 ANNUAL VISIT FOR GENERAL ADULT MEDICAL EXAMINATION WITH ABNORMAL FINDINGS: Primary | ICD-10-CM

## 2024-11-04 DIAGNOSIS — I10 HYPERTENSION, UNSPECIFIED TYPE: ICD-10-CM

## 2024-11-04 DIAGNOSIS — E78.2 HYPERLIPEMIA, MIXED: ICD-10-CM

## 2024-11-04 DIAGNOSIS — R73.9 HYPERGLYCEMIA: ICD-10-CM

## 2024-11-04 DIAGNOSIS — Z00.01 ANNUAL VISIT FOR GENERAL ADULT MEDICAL EXAMINATION WITH ABNORMAL FINDINGS: ICD-10-CM

## 2024-11-04 DIAGNOSIS — I82.432 ACUTE DEEP VEIN THROMBOSIS (DVT) OF POPLITEAL VEIN OF LEFT LOWER EXTREMITY (MULTI): ICD-10-CM

## 2024-11-04 DIAGNOSIS — Z85.038 HISTORY OF COLON CANCER, STAGE II: ICD-10-CM

## 2024-11-04 DIAGNOSIS — Z86.711 HISTORY OF PULMONARY EMBOLUS (PE): ICD-10-CM

## 2024-11-04 PROBLEM — Z11.59 NEED FOR HEPATITIS C SCREENING TEST: Status: RESOLVED | Noted: 2024-03-21 | Resolved: 2024-11-04

## 2024-11-04 PROBLEM — Z90.49 HISTORY OF PARTIAL COLECTOMY: Status: RESOLVED | Noted: 2024-09-09 | Resolved: 2024-11-04

## 2024-11-04 PROBLEM — D03.9 MELANOMA IN SITU, UNSPECIFIED: Status: RESOLVED | Noted: 2023-01-26 | Resolved: 2024-11-04

## 2024-11-04 PROBLEM — Z09 HOSPITAL DISCHARGE FOLLOW-UP: Status: RESOLVED | Noted: 2024-09-09 | Resolved: 2024-11-04

## 2024-11-04 PROBLEM — Z11.4 SCREENING FOR HIV WITHOUT PRESENCE OF RISK FACTORS: Status: RESOLVED | Noted: 2023-07-14 | Resolved: 2024-11-04

## 2024-11-04 LAB
ALBUMIN SERPL BCP-MCNC: 4.3 G/DL (ref 3.4–5)
ALP SERPL-CCNC: 60 U/L (ref 33–120)
ALT SERPL W P-5'-P-CCNC: 24 U/L (ref 10–52)
ANION GAP SERPL CALC-SCNC: 14 MMOL/L (ref 10–20)
AST SERPL W P-5'-P-CCNC: 17 U/L (ref 9–39)
BASOPHILS # BLD AUTO: 0.05 X10*3/UL (ref 0–0.1)
BASOPHILS NFR BLD AUTO: 0.6 %
BILIRUB SERPL-MCNC: 0.5 MG/DL (ref 0–1.2)
BUN SERPL-MCNC: 22 MG/DL (ref 6–23)
CALCIUM SERPL-MCNC: 8.9 MG/DL (ref 8.6–10.6)
CHLORIDE SERPL-SCNC: 107 MMOL/L (ref 98–107)
CHOLEST SERPL-MCNC: 151 MG/DL (ref 0–199)
CHOLESTEROL/HDL RATIO: 2.8
CO2 SERPL-SCNC: 23 MMOL/L (ref 21–32)
CREAT SERPL-MCNC: 1.15 MG/DL (ref 0.5–1.3)
EGFRCR SERPLBLD CKD-EPI 2021: 75 ML/MIN/1.73M*2
EOSINOPHIL # BLD AUTO: 0.19 X10*3/UL (ref 0–0.7)
EOSINOPHIL NFR BLD AUTO: 2.1 %
ERYTHROCYTE [DISTWIDTH] IN BLOOD BY AUTOMATED COUNT: 19.4 % (ref 11.5–14.5)
EST. AVERAGE GLUCOSE BLD GHB EST-MCNC: 120 MG/DL
GLUCOSE SERPL-MCNC: 100 MG/DL (ref 74–99)
HBA1C MFR BLD: 5.8 %
HCT VFR BLD AUTO: 44.6 % (ref 41–52)
HDLC SERPL-MCNC: 53 MG/DL
HGB BLD-MCNC: 13.6 G/DL (ref 13.5–17.5)
IMM GRANULOCYTES # BLD AUTO: 0.1 X10*3/UL (ref 0–0.7)
IMM GRANULOCYTES NFR BLD AUTO: 1.1 % (ref 0–0.9)
LDLC SERPL CALC-MCNC: 77 MG/DL
LYMPHOCYTES # BLD AUTO: 1.83 X10*3/UL (ref 1.2–4.8)
LYMPHOCYTES NFR BLD AUTO: 20.3 %
MAGNESIUM SERPL-MCNC: 2.14 MG/DL (ref 1.6–2.4)
MCH RBC QN AUTO: 24.2 PG (ref 26–34)
MCHC RBC AUTO-ENTMCNC: 30.5 G/DL (ref 32–36)
MCV RBC AUTO: 79 FL (ref 80–100)
MONOCYTES # BLD AUTO: 0.67 X10*3/UL (ref 0.1–1)
MONOCYTES NFR BLD AUTO: 7.4 %
NEUTROPHILS # BLD AUTO: 6.19 X10*3/UL (ref 1.2–7.7)
NEUTROPHILS NFR BLD AUTO: 68.5 %
NON HDL CHOLESTEROL: 98 MG/DL (ref 0–149)
NRBC BLD-RTO: 0 /100 WBCS (ref 0–0)
PLATELET # BLD AUTO: 259 X10*3/UL (ref 150–450)
POTASSIUM SERPL-SCNC: 4.2 MMOL/L (ref 3.5–5.3)
PROT SERPL-MCNC: 7.3 G/DL (ref 6.4–8.2)
PSA SERPL-MCNC: 0.69 NG/ML
RBC # BLD AUTO: 5.62 X10*6/UL (ref 4.5–5.9)
SODIUM SERPL-SCNC: 140 MMOL/L (ref 136–145)
TRIGL SERPL-MCNC: 105 MG/DL (ref 0–149)
TSH SERPL-ACNC: 1.52 MIU/L (ref 0.44–3.98)
VLDL: 21 MG/DL (ref 0–40)
WBC # BLD AUTO: 9 X10*3/UL (ref 4.4–11.3)

## 2024-11-04 PROCEDURE — 85025 COMPLETE CBC W/AUTO DIFF WBC: CPT

## 2024-11-04 PROCEDURE — 36415 COLL VENOUS BLD VENIPUNCTURE: CPT

## 2024-11-04 PROCEDURE — 99213 OFFICE O/P EST LOW 20 MIN: CPT | Performed by: INTERNAL MEDICINE

## 2024-11-04 PROCEDURE — 83735 ASSAY OF MAGNESIUM: CPT

## 2024-11-04 PROCEDURE — 3078F DIAST BP <80 MM HG: CPT | Performed by: INTERNAL MEDICINE

## 2024-11-04 PROCEDURE — 3008F BODY MASS INDEX DOCD: CPT | Performed by: INTERNAL MEDICINE

## 2024-11-04 PROCEDURE — 99396 PREV VISIT EST AGE 40-64: CPT | Performed by: INTERNAL MEDICINE

## 2024-11-04 PROCEDURE — 83036 HEMOGLOBIN GLYCOSYLATED A1C: CPT

## 2024-11-04 PROCEDURE — 84443 ASSAY THYROID STIM HORMONE: CPT

## 2024-11-04 PROCEDURE — 1036F TOBACCO NON-USER: CPT | Performed by: INTERNAL MEDICINE

## 2024-11-04 PROCEDURE — 80053 COMPREHEN METABOLIC PANEL: CPT

## 2024-11-04 PROCEDURE — 3074F SYST BP LT 130 MM HG: CPT | Performed by: INTERNAL MEDICINE

## 2024-11-04 PROCEDURE — 84153 ASSAY OF PSA TOTAL: CPT

## 2024-11-04 PROCEDURE — 80061 LIPID PANEL: CPT

## 2024-11-04 RX ORDER — WARFARIN SODIUM 5 MG/1
TABLET ORAL
COMMUNITY

## 2024-11-04 ASSESSMENT — PATIENT HEALTH QUESTIONNAIRE - PHQ9
2. FEELING DOWN, DEPRESSED OR HOPELESS: NOT AT ALL
1. LITTLE INTEREST OR PLEASURE IN DOING THINGS: NOT AT ALL
SUM OF ALL RESPONSES TO PHQ9 QUESTIONS 1 AND 2: 0

## 2024-11-04 NOTE — PROGRESS NOTES
Subjective   Patient ID: Alcon Covarrubias is a 54 y.o. male who presents for Annual Exam.    Assessment/Plan     Problem List Items Addressed This Visit       Hypertension     Patients BP readings reviewed and addressed, as we age our arteries turn stiffer and less elastic. Restricting salt consumption and staying physically fit with regular exercise regimen is the only way to keep our vasculature less tonic. Studies have shown that keeping ideal body wt, exercise routine about 140 to 150 minutes a week, eating variety of plant based diet and drinking plentiful water are quite helpful. Monitor BP twice or once a week at home and bring log to be reviewed by me. Uncontrolled BP has long term consequences including heart failure, myocardial infarction, accelerated atherosclerosis and kidney dysfunction. Therapy reviewed and explained.           Relevant Orders    CBC and Auto Differential    Comprehensive Metabolic Panel    Hemoglobin A1C    Lipid Panel    Magnesium    TSH with reflex to Free T4 if abnormal    Prostate Specific Antigen, Screen    CT cardiac scoring wo IV contrast    Hyperlipemia, mixed    Relevant Orders    CBC and Auto Differential    Comprehensive Metabolic Panel    Hemoglobin A1C    Lipid Panel    Magnesium    TSH with reflex to Free T4 if abnormal    Prostate Specific Antigen, Screen    Annual visit for general adult medical examination with abnormal findings - Primary    Relevant Orders    CBC and Auto Differential    Comprehensive Metabolic Panel    Hemoglobin A1C    Lipid Panel    Magnesium    TSH with reflex to Free T4 if abnormal    Prostate Specific Antigen, Screen    Acute deep vein thrombosis (DVT) of popliteal vein of left lower extremity (Multi)    History of pulmonary embolus (PE)     History of colon cancer DVT or PE anemia advise continue follow-up with the Coumadin clinic refer patient to hematology Dr. Coburn    For further evaluations already seen by Dr. Woo need to do the  thorough workup for iron deficiency anemia with a colon cancer and on Coumadin         History of colon cancer, stage II    Hyperglycemia    Relevant Orders    Hemoglobin A1c    CBC and Auto Differential    Comprehensive Metabolic Panel    Hemoglobin A1C    Lipid Panel    Magnesium    TSH with reflex to Free T4 if abnormal    Prostate Specific Antigen, Screen     Patient was evaluated today, problem list was reviewed, problems and concerns addressed, Rx list reviewed and updated, lab and tests were noted and reviewed. Life style changes were discussed, always it works better if we eat plant based diet and plenty of fibres and roughage. Consume adequate amount of water and avoid alcohol, light to moderate physical activities and stress reduction are always beneficial for ongoing physical well being. Do not forget to have 6 to 7 hours of sleep regularly and avoid late night renate screen exposure.    HPI 54-year-old patient  with a 2 children    1 brother 1 sister good health    Mother of hypertension    Father of colon cancer metastasis past    Personal history of negative for tobacco alcohol drug    Work for [employer]    History of DVT PE on anticoagulation follow-up with the Coumadin clinic    Recently diagnosed iron deficiency anemia seen by the gastrology hematology going for the IV Venofer therapy at the [facility]    Along discussed with the patient with the strong family portions of colon cancer on Coumadin for DVT or PE this year patient with her workup with GI and med oncology regarding the source of cause of the anemia    Negative for weight loss jaundice hematuria rectal bleeding steatorrhea    Negative for night sweats pruritus  Past Medical History:   Diagnosis Date    Abnormal levels of other serum enzymes 06/19/2018    Elevated CPK    Body mass index (BMI) 32.0-32.9, adult 03/17/2022    BMI 32.0-32.9,adult    Contact with and (suspected) exposure to covid-19 12/03/2020    COVID-19  ruled out by laboratory testing    COVID-19 07/12/2021    Lab test positive for detection of COVID-19 virus    Encounter for screening for malignant neoplasm of colon 08/25/2022    Screen for colon cancer    Encounter for screening for malignant neoplasm of rectum 08/25/2022    Screening for rectal cancer    Encounter for screening for malignant neoplasm of respiratory organs 08/25/2022    Encounter for screening for lung cancer    Other chest pain 05/16/2019    Atypical chest pain    Other long term (current) drug therapy 06/15/2020    Long term use of drug    Otitis media, unspecified, unspecified ear 07/08/2015    Ear infection    Pain in left shoulder 02/26/2018    Chronic left shoulder pain    Periapical abscess without sinus 03/13/2020    Tooth infection    Personal history of malignant melanoma of skin     History of malignant melanoma of skin    Personal history of other diseases of the musculoskeletal system and connective tissue 01/19/2015    History of low back pain    Personal history of other endocrine, nutritional and metabolic disease 06/15/2020    History of vitamin D deficiency    Personal history of other endocrine, nutritional and metabolic disease 06/15/2020    History of morbid obesity    Personal history of other infectious and parasitic diseases 08/25/2022    History of herpes labialis    Personal history of urinary (tract) infections 07/16/2019    History of urinary tract infection     Past Surgical History:   Procedure Laterality Date    APPENDECTOMY  07/08/2014    Appendectomy    MOUTH SURGERY      tooth implant     No Known Allergies  Current Outpatient Medications   Medication Sig Dispense Refill    cholecalciferol (Vitamin D-3) 25 MCG (1000 UT) tablet Vitamin D3 25 MCG (1000 UT) Oral Tablet   Refills: 0        Mark Petersen MD   Start : 1-Feb-2021   Active      coenzyme Q-10 100 mg capsule CoQ10 100 MG Oral Capsule   Refills: 0        Mark Petersen MD   Start : 1-Feb-2021   Active       folic acid (Folvite) 400 mcg tablet Folic Acid 400 MCG Oral Tablet   Refills: 0        Trinity GREGG, Mark   Start : 7-Jun-2016   Active      multivitamin tablet Take 1 tablet by mouth once daily.      omega-3/dha/epa/fish oil (OMEGA-3 ORAL) MegaRed Omega-3 Krill Oil 350 MG Oral Capsule      rosuvastatin (Crestor) 20 mg tablet Take 1 tablet (20 mg) by mouth once daily. In the pm 90 tablet 3    trandolapril (Mavik) 1 mg tablet Take 1 tablet (1 mg) by mouth once daily in the evening. In the pm 90 tablet 3    warfarin (Coumadin) 5 mg tablet TAKE 1 TABLET BY MOUTH DAILY OR AS INSTRUCTED BY THE COUMADIN CLINIC. QUESTIONS? CALL [phone]       No current facility-administered medications for this visit.     Family History   Problem Relation Name Age of Onset    No Known Problems Mother      Liver cancer Father      Kidney failure Father      Other (cardiac disorder) Father       Social History     Socioeconomic History    Marital status:    Tobacco Use    Smoking status: Former     Types: Cigarettes    Smokeless tobacco: Never   Substance and Sexual Activity    Alcohol use: Yes     Alcohol/week: 0.0 - 3.0 standard drinks of alcohol    Drug use: Never     Social Drivers of Health     Food Insecurity: No Food Insecurity (7/26/2024)    Received from [facility]    Hunger Vital Sign     Worried About Running Out of Food in the Last Year: Never true     Ran Out of Food in the Last Year: Never true   Transportation Needs: No Transportation Needs (7/26/2024)    Received from [facility]    PRAPARE - Transportation     Lack of Transportation (Medical): No     Lack of Transportation (Non-Medical): No     Immunization History   Administered Date(s) Administered    Influenza, Unspecified 10/12/2020    Influenza, seasonal, injectable 10/12/2020    Pfizer Purple Cap SARS-CoV-2 03/31/2021, 04/21/2021, 12/11/2021    Zoster vaccine, recombinant, adult (SHINGRIX) 06/15/2020       Review of Systems  Review of  "systems is otherwise negative unless stated above or in history of present illness.    Objective   Visit Vitals  /67   Pulse 58   Temp 36.1 °C (96.9 °F)   Ht 1.727 m (5' 8\")   Wt 92.1 kg (203 lb)   SpO2 98%   BMI 30.87 kg/m²   Smoking Status Former   BSA 2.1 m²     Physical Exam  Constitutional: BMI 30     General: not in acute distress.   HENT:      Head: Normocephalic and atraumatic.      Nose: Nose normal.   Eyes: No jaundice     Extraocular Movements: Extraocular movements intact.      Conjunctiva/sclera: Conjunctivae normal.   Cardiovascular: Systolic heart murmur     Rate and Rhythm: Normal rate ,  No M/R/G  Pulmonary:      Effort: Pulmonary effort is normal.      Breath sounds: Normal, Bilat Equal AE  Skin: Dry skin     General: Skin is warm.   Neurological:      Mental Status: He is alert and oriented to person, place, and time.   Psychiatric:         Mood and Affect: Mood normal.         Behavior: Behavior normal.   Musculoskeletal   FROM in all extremitirs,  Joint-no swelling or tenderness    FOBT negative prostate smooth nonenlarged    No visits with results within 4 Month(s) from this visit.   Latest known visit with results is:   Orders Only on 03/25/2024   Component Date Value Ref Range Status    [facility] HIE Misc Sendout 03/25/2024 See Report   Final       Radiology: Reviewed imaging in powerchart.  No results found.      Charting was completed using voice recognition technology and may include unintended errors.       "

## 2024-11-04 NOTE — ASSESSMENT & PLAN NOTE
History of colon cancer DVT or PE anemia advise continue follow-up with the Coumadin clinic refer patient to hematology Dr. Coburn    For further evaluations already seen by Dr. Woo need to do the thorough workup for iron deficiency anemia with a colon cancer and on Coumadin

## 2024-11-05 ENCOUNTER — TELEPHONE (OUTPATIENT)
Dept: PRIMARY CARE | Facility: CLINIC | Age: 54
End: 2024-11-05
Payer: COMMERCIAL

## 2024-11-05 NOTE — TELEPHONE ENCOUNTER
----- Message from Mark Petersen sent at 11/5/2024  9:11 AM EST -----  Hemoglobin A1c 5.8 mild anemia glucose 100 prostate normal LDL cholesterol 77 magnesium normal    Low-carb diet    Slow Fe 1 a day    Follow-up

## 2024-11-08 ENCOUNTER — TELEPHONE (OUTPATIENT)
Dept: PRIMARY CARE | Facility: CLINIC | Age: 54
End: 2024-11-08
Payer: COMMERCIAL

## 2024-11-08 NOTE — TELEPHONE ENCOUNTER
PT was downstairs getting an iron infusion and they did not like his resting heart rate which was according to PT, 53    Could this be because of his meds?

## 2024-12-09 ENCOUNTER — PATIENT OUTREACH (OUTPATIENT)
Dept: PRIMARY CARE | Facility: CLINIC | Age: 54
End: 2024-12-09
Payer: COMMERCIAL

## 2024-12-09 NOTE — PROGRESS NOTES
Discharge Facility:[facility] [place]  Discharge Diagnosis:DVT LLE  Admission Date:12.5.24  Discharge Date: 12.6.24    PCP Appointment Date:Messaged clinical staff. Two missed calls. No appt    Specialist Appointment Date:   Hospital Encounter and Summary Linked: Yes  2 call jeanie

## 2024-12-12 LAB
NON-UH HIE A/G RATIO: 1.1
NON-UH HIE ALB: 3.7 G/DL (ref 3.4–5)
NON-UH HIE ALK PHOS: 66 UNIT/L (ref 45–117)
NON-UH HIE BASO COUNT: 0.04 X1000 (ref 0–0.2)
NON-UH HIE BASOS %: 0.6 %
NON-UH HIE BILIRUBIN, TOTAL: 0.6 MG/DL (ref 0.3–1.2)
NON-UH HIE BUN/CREAT RATIO: 13.1
NON-UH HIE BUN: 17 MG/DL (ref 9–23)
NON-UH HIE CALCIUM: 9.5 MG/DL (ref 8.7–10.4)
NON-UH HIE CALCULATED OSMOLALITY: 283 MOSM/KG (ref 275–295)
NON-UH HIE CHLORIDE: 109 MMOL/L (ref 98–107)
NON-UH HIE CO2, VENOUS: 24 MMOL/L (ref 20–31)
NON-UH HIE CREATININE: 1.3 MG/DL (ref 0.6–1.1)
NON-UH HIE DIFF?: NO
NON-UH HIE DXH ACTIONS: ABNORMAL
NON-UH HIE EOS COUNT: 0.06 X1000 (ref 0–0.5)
NON-UH HIE EOSIN %: 1 %
NON-UH HIE FERRITIN: 115 NG/ML (ref 22–322)
NON-UH HIE FOLATE: 33.7 NG/ML (ref 5.4–17.5)
NON-UH HIE GFR AA: >60
NON-UH HIE GLOBULIN: 3.4 G/DL
NON-UH HIE GLOMERULAR FILTRATION RATE: 57 ML/MIN/1.73M?
NON-UH HIE GLUCOSE: 100 MG/DL (ref 74–106)
NON-UH HIE GOT: 33 UNIT/L (ref 15–37)
NON-UH HIE GPT: 66 UNIT/L (ref 10–49)
NON-UH HIE HCT: 43 % (ref 41–52)
NON-UH HIE HGB: 14.4 G/DL (ref 13.5–17.5)
NON-UH HIE INSTR WBC: 5.7
NON-UH HIE IRON: 77 UG/DL (ref 65–175)
NON-UH HIE K: 4.4 MMOL/L (ref 3.5–5.1)
NON-UH HIE LYMPH %: 22.4 %
NON-UH HIE LYMPH COUNT: 1.28 X1000 (ref 1.2–4.8)
NON-UH HIE MCH: 26.7 PG (ref 27–34)
NON-UH HIE MCHC: 33.6 G/DL (ref 32–37)
NON-UH HIE MCV: 79.6 FL (ref 80–100)
NON-UH HIE MONO %: 7.2 %
NON-UH HIE MONO COUNT: 0.41 X1000 (ref 0.1–1)
NON-UH HIE MPV: 7 FL (ref 7.4–10.4)
NON-UH HIE NA: 141 MMOL/L (ref 135–145)
NON-UH HIE NEUTROPHIL %: 68.8 %
NON-UH HIE NEUTROPHIL COUNT (ANC): 3.93 X1000 (ref 1.4–8.8)
NON-UH HIE NUCLEATED RBC: 0 /100WBC
NON-UH HIE PLATELET: 251 X10 (ref 150–450)
NON-UH HIE RBC: 5.4 X10 (ref 4.7–6.1)
NON-UH HIE RDW: 20.2 % (ref 11.5–14.5)
NON-UH HIE RED BLOOD CELL MORPHOLOGY: ABNORMAL
NON-UH HIE SATURATION: 22.5 % (ref 20–50)
NON-UH HIE SCAN DIFFERENTIAL: ABNORMAL
NON-UH HIE TIBC: 342 UG/ML (ref 250–425)
NON-UH HIE TOTAL PROTEIN: 7.1 G/DL (ref 5.7–8.2)
NON-UH HIE VITAMIN B12: 628 PG/ML (ref 211–911)
NON-UH HIE WBC: 5.7 X10 (ref 4.5–11)

## 2024-12-14 LAB
Lab: 113 % (ref 76–128)
Lab: 97 % (ref 82–136)

## 2024-12-15 LAB
Lab: NORMAL
NON-UH HIE B2GLYCOPROTEIN 1, IGG ANTIBODY: 111
NON-UH HIE B2GLYCOPROTEIN 1, IGM ANTIBODY: 49
NON-UH HIE CARDIOLIPIN AB IGG: >150
NON-UH HIE CARDIOLIPIN AB IGM: 21
NON-UH HIE MISC SENDOUT: NORMAL

## 2024-12-27 ENCOUNTER — PATIENT OUTREACH (OUTPATIENT)
Dept: PRIMARY CARE | Facility: CLINIC | Age: 54
End: 2024-12-27
Payer: COMMERCIAL

## 2024-12-27 NOTE — PROGRESS NOTES
Unable to reach patient for call back after recent hospital stay.  LVM  with call back number for patient to call if needed. If no voicemail available call attempts x 2 were made to contact the patient to assist with any questions or concerns patient may have.

## 2025-01-16 DIAGNOSIS — I10 HYPERTENSION, UNSPECIFIED TYPE: ICD-10-CM

## 2025-01-16 DIAGNOSIS — E78.2 HYPERLIPIDEMIA, MIXED: ICD-10-CM

## 2025-01-16 RX ORDER — ROSUVASTATIN CALCIUM 20 MG/1
20 TABLET, COATED ORAL EVERY EVENING
Qty: 90 TABLET | Refills: 3 | Status: SHIPPED | OUTPATIENT
Start: 2025-01-16

## 2025-01-16 RX ORDER — TRANDOLAPRIL 1 MG/1
TABLET ORAL
Qty: 90 TABLET | Refills: 3 | Status: SHIPPED | OUTPATIENT
Start: 2025-01-16

## 2025-01-24 ENCOUNTER — PATIENT OUTREACH (OUTPATIENT)
Dept: PRIMARY CARE | Facility: CLINIC | Age: 55
End: 2025-01-24
Payer: COMMERCIAL

## 2025-03-02 ENCOUNTER — HOSPITAL ENCOUNTER (OUTPATIENT)
Dept: RADIOLOGY | Facility: HOSPITAL | Age: 55
Discharge: HOME | End: 2025-03-02
Payer: COMMERCIAL

## 2025-03-02 DIAGNOSIS — I10 HYPERTENSION, UNSPECIFIED TYPE: ICD-10-CM

## 2025-03-02 PROCEDURE — 75571 CT HRT W/O DYE W/CA TEST: CPT

## 2025-03-04 ENCOUNTER — PATIENT OUTREACH (OUTPATIENT)
Dept: PRIMARY CARE | Facility: CLINIC | Age: 55
End: 2025-03-04

## 2025-03-04 ENCOUNTER — APPOINTMENT (OUTPATIENT)
Dept: PRIMARY CARE | Facility: CLINIC | Age: 55
End: 2025-03-04
Payer: COMMERCIAL

## 2025-03-04 VITALS
OXYGEN SATURATION: 99 % | TEMPERATURE: 97.7 F | HEIGHT: 68 IN | HEART RATE: 70 BPM | SYSTOLIC BLOOD PRESSURE: 133 MMHG | WEIGHT: 214.6 LBS | DIASTOLIC BLOOD PRESSURE: 75 MMHG | BODY MASS INDEX: 32.52 KG/M2

## 2025-03-04 DIAGNOSIS — R05.8 ACE-INHIBITOR COUGH: Primary | ICD-10-CM

## 2025-03-04 DIAGNOSIS — T78.40XA ALLERGY, INITIAL ENCOUNTER: ICD-10-CM

## 2025-03-04 DIAGNOSIS — N18.31 STAGE 3A CHRONIC KIDNEY DISEASE (MULTI): ICD-10-CM

## 2025-03-04 DIAGNOSIS — T46.4X5A ACE-INHIBITOR COUGH: Primary | ICD-10-CM

## 2025-03-04 DIAGNOSIS — I82.432 ACUTE DEEP VEIN THROMBOSIS (DVT) OF POPLITEAL VEIN OF LEFT LOWER EXTREMITY (MULTI): ICD-10-CM

## 2025-03-04 DIAGNOSIS — C18.9 MALIGNANT NEOPLASM OF COLON, UNSPECIFIED PART OF COLON (MULTI): ICD-10-CM

## 2025-03-04 DIAGNOSIS — D68.61 ANTIPHOSPHOLIPID SYNDROME (MULTI): ICD-10-CM

## 2025-03-04 DIAGNOSIS — I10 BENIGN ESSENTIAL HYPERTENSION: ICD-10-CM

## 2025-03-04 LAB
ALBUMIN SERPL-MCNC: 4.7 G/DL (ref 3.6–5.1)
ALP SERPL-CCNC: 58 U/L (ref 35–144)
ALT SERPL-CCNC: 37 U/L (ref 9–46)
ANION GAP SERPL CALCULATED.4IONS-SCNC: 11 MMOL/L (CALC) (ref 7–17)
AST SERPL-CCNC: 29 U/L (ref 10–35)
BASOPHILS # BLD AUTO: 23 CELLS/UL (ref 0–200)
BASOPHILS NFR BLD AUTO: 0.3 %
BILIRUB SERPL-MCNC: 1.1 MG/DL (ref 0.2–1.2)
BUN SERPL-MCNC: 19 MG/DL (ref 7–25)
CALCIUM SERPL-MCNC: 9.4 MG/DL (ref 8.6–10.3)
CHLORIDE SERPL-SCNC: 100 MMOL/L (ref 98–110)
CO2 SERPL-SCNC: 26 MMOL/L (ref 20–32)
CREAT SERPL-MCNC: 1.31 MG/DL (ref 0.7–1.3)
EGFRCR SERPLBLD CKD-EPI 2021: 64 ML/MIN/1.73M2
EOSINOPHIL # BLD AUTO: 53 CELLS/UL (ref 15–500)
EOSINOPHIL NFR BLD AUTO: 0.7 %
ERYTHROCYTE [DISTWIDTH] IN BLOOD BY AUTOMATED COUNT: 13.1 % (ref 11–15)
GLUCOSE SERPL-MCNC: 96 MG/DL (ref 65–99)
HCT VFR BLD AUTO: 45.1 % (ref 38.5–50)
HGB BLD-MCNC: 15 G/DL (ref 13.2–17.1)
LYMPHOCYTES # BLD AUTO: 1508 CELLS/UL (ref 850–3900)
LYMPHOCYTES NFR BLD AUTO: 20.1 %
MCH RBC QN AUTO: 28.8 PG (ref 27–33)
MCHC RBC AUTO-ENTMCNC: 33.3 G/DL (ref 32–36)
MCV RBC AUTO: 86.6 FL (ref 80–100)
MONOCYTES # BLD AUTO: 510 CELLS/UL (ref 200–950)
MONOCYTES NFR BLD AUTO: 6.8 %
NEUTROPHILS # BLD AUTO: 5408 CELLS/UL (ref 1500–7800)
NEUTROPHILS NFR BLD AUTO: 72.1 %
PLATELET # BLD AUTO: 289 THOUSAND/UL (ref 140–400)
PMV BLD REES-ECKER: 9.1 FL (ref 7.5–12.5)
POTASSIUM SERPL-SCNC: 4.2 MMOL/L (ref 3.5–5.3)
PROT SERPL-MCNC: 7.4 G/DL (ref 6.1–8.1)
RBC # BLD AUTO: 5.21 MILLION/UL (ref 4.2–5.8)
SODIUM SERPL-SCNC: 137 MMOL/L (ref 135–146)
TESTOST FREE SERPL-MCNC: NORMAL PG/ML
TESTOST SERPL-MCNC: NORMAL NG/DL
WBC # BLD AUTO: 7.5 THOUSAND/UL (ref 3.8–10.8)

## 2025-03-04 PROCEDURE — 3075F SYST BP GE 130 - 139MM HG: CPT | Performed by: INTERNAL MEDICINE

## 2025-03-04 PROCEDURE — 3078F DIAST BP <80 MM HG: CPT | Performed by: INTERNAL MEDICINE

## 2025-03-04 PROCEDURE — 1036F TOBACCO NON-USER: CPT | Performed by: INTERNAL MEDICINE

## 2025-03-04 PROCEDURE — 96372 THER/PROPH/DIAG INJ SC/IM: CPT | Performed by: INTERNAL MEDICINE

## 2025-03-04 PROCEDURE — 99214 OFFICE O/P EST MOD 30 MIN: CPT | Performed by: INTERNAL MEDICINE

## 2025-03-04 PROCEDURE — 3008F BODY MASS INDEX DOCD: CPT | Performed by: INTERNAL MEDICINE

## 2025-03-04 RX ORDER — OLMESARTAN MEDOXOMIL 5 MG/1
5 TABLET ORAL DAILY
Qty: 90 TABLET | Refills: 1 | Status: SHIPPED | OUTPATIENT
Start: 2025-03-04

## 2025-03-04 RX ORDER — FONDAPARINUX SODIUM 7.5 MG/.6ML
INJECTION SUBCUTANEOUS
COMMUNITY
Start: 2025-02-27

## 2025-03-04 RX ORDER — ASPIRIN 81 MG/1
81 TABLET ORAL DAILY
COMMUNITY

## 2025-03-04 RX ORDER — METHYLPREDNISOLONE ACETATE 40 MG/ML
40 INJECTION, SUSPENSION INTRA-ARTICULAR; INTRALESIONAL; INTRAMUSCULAR; SOFT TISSUE ONCE
Status: COMPLETED | OUTPATIENT
Start: 2025-03-04 | End: 2025-03-04

## 2025-03-04 RX ADMIN — METHYLPREDNISOLONE ACETATE 40 MG: 40 INJECTION, SUSPENSION INTRA-ARTICULAR; INTRALESIONAL; INTRAMUSCULAR; SOFT TISSUE at 10:33

## 2025-03-04 NOTE — PROGRESS NOTES
Subjective   Patient ID: Alcon Covarrubias is a 54 y.o. male who presents for Follow-up (4 month/Throat issue ).    Assessment/Plan     Problem List Items Addressed This Visit       Benign essential hypertension    Relevant Medications    olmesartan (Benicar) 5 mg tablet    Other Relevant Orders    Comprehensive Metabolic Panel    CBC and Auto Differential    Testosterone, total and free    Acute deep vein thrombosis (DVT) of popliteal vein of left lower extremity (Multi)    Relevant Orders    Comprehensive Metabolic Panel    CBC and Auto Differential    Testosterone, total and free    Malignant neoplasm of colon, unspecified part of colon (Multi)    Antiphospholipid syndrome (Multi)    Relevant Medications    fondaparinux (Arixtra) 7.5 mg/0.6 mL syringe    aspirin 81 mg EC tablet    Stage 3a chronic kidney disease (Multi)    Relevant Orders    Comprehensive Metabolic Panel    CBC and Auto Differential    Testosterone, total and free    Allergies    Relevant Medications    methylPREDNISolone acetate (DEPO-Medrol) injection 40 mg (Completed)    ACE-inhibitor cough - Primary     Patient was evaluated today, problem list was reviewed, problems and concerns addressed, Rx list reviewed and updated, lab and tests were noted and reviewed. Life style changes were discussed, always it works better if we eat plant based diet and plenty of fibres and roughage. Consume adequate amount of water and avoid alcohol, light to moderate physical activities and stress reduction are always beneficial for ongoing physical well being. Do not forget to have 6 to 7 hours of sleep regularly and avoid late night renate screen exposure.    HPI 54-year-old patient who had a history of hypertension hyperlipidemia colon cancer survival    Creatinine 1.3 GPT 66.  GFR 57.  Folate 33.7.  Cardio antibody IgM 21.  IgM antibody P2 glycoprotein 49.  Genetic analysis positive.    Complaining of arthralgia myalgia fatigue tired irritation in throat difficulty  to breathe once in a while    Dry cough.    Negative for hypoxia angioedema    Negative for fall    Discussed with the patient reviewed labs advised to follow-up with him at oncology    Discontinue Mavik replace with the Benicar    Give Solu-Medrol 40 intramuscular    Benadryl as needed    Negative for hypoxia hemoptysis rectal bleeding or weight loss.  Past Medical History:   Diagnosis Date    Abnormal levels of other serum enzymes 06/19/2018    Elevated CPK    Body mass index (BMI) 32.0-32.9, adult 03/17/2022    BMI 32.0-32.9,adult    Contact with and (suspected) exposure to covid-19 12/03/2020    COVID-19 ruled out by laboratory testing    COVID-19 07/12/2021    Lab test positive for detection of COVID-19 virus    Encounter for screening for malignant neoplasm of colon 08/25/2022    Screen for colon cancer    Encounter for screening for malignant neoplasm of rectum 08/25/2022    Screening for rectal cancer    Encounter for screening for malignant neoplasm of respiratory organs 08/25/2022    Encounter for screening for lung cancer    Other chest pain 05/16/2019    Atypical chest pain    Other long term (current) drug therapy 06/15/2020    Long term use of drug    Otitis media, unspecified, unspecified ear 07/08/2015    Ear infection    Pain in left shoulder 02/26/2018    Chronic left shoulder pain    Periapical abscess without sinus 03/13/2020    Tooth infection    Personal history of malignant melanoma of skin     History of malignant melanoma of skin    Personal history of other diseases of the musculoskeletal system and connective tissue 01/19/2015    History of low back pain    Personal history of other endocrine, nutritional and metabolic disease 06/15/2020    History of vitamin D deficiency    Personal history of other endocrine, nutritional and metabolic disease 06/15/2020    History of morbid obesity    Personal history of other infectious and parasitic diseases 08/25/2022    History of herpes labialis     Personal history of urinary (tract) infections 07/16/2019    History of urinary tract infection     Past Surgical History:   Procedure Laterality Date    APPENDECTOMY  07/08/2014    Appendectomy    MOUTH SURGERY      tooth implant     No Known Allergies  Current Outpatient Medications   Medication Sig Dispense Refill    aspirin 81 mg EC tablet Take 1 tablet (81 mg) by mouth once daily.      cholecalciferol (Vitamin D-3) 25 MCG (1000 UT) tablet Vitamin D3 25 MCG (1000 UT) Oral Tablet   Refills: 0        Mark Petersen MD   Start : 1-Feb-2021   Active      folic acid (Folvite) 400 mcg tablet Folic Acid 400 MCG Oral Tablet   Refills: 0        Mark Petersen MD   Start : 7-Jun-2016   Active      fondaparinux (Arixtra) 7.5 mg/0.6 mL syringe INJECT 0.6 ML UNDER THE SKIN DAILY FOR 30 DAYS      multivitamin tablet Take 1 tablet by mouth once daily.      rosuvastatin (Crestor) 20 mg tablet TAKE 1 TABLET (20 MG) BY MOUTH ONCE DAILY. IN THE EVENING 90 tablet 3    trandolapril (Mavik) 1 mg tablet TAKE 1 TABLET (1 MG) BY MOUTH ONCE DAILY IN THE EVENING. IN THE EVENING 90 tablet 3    olmesartan (Benicar) 5 mg tablet Take 1 tablet (5 mg) by mouth once daily. 90 tablet 1     No current facility-administered medications for this visit.     Family History   Problem Relation Name Age of Onset    No Known Problems Mother      Liver cancer Father      Kidney failure Father      Other (cardiac disorder) Father       Social History     Socioeconomic History    Marital status:    Tobacco Use    Smoking status: Former     Types: Cigarettes    Smokeless tobacco: Never   Substance and Sexual Activity    Alcohol use: Not Currently    Drug use: Never     Social Drivers of Health     Food Insecurity: No Food Insecurity (7/26/2024)    Received from [facility]    Hunger Vital Sign     Worried About Running Out of Food in the Last Year: Never true     Ran Out of Food in the Last Year: Never true   Transportation Needs: No  "Transportation Needs (7/26/2024)    Received from [facility] - Transportation     Lack of Transportation (Medical): No     Lack of Transportation (Non-Medical): No     Immunization History   Administered Date(s) Administered    Influenza, Unspecified 10/12/2020    Influenza, seasonal, injectable 10/12/2020    Pfizer Purple Cap SARS-CoV-2 03/31/2021, 04/21/2021, 12/11/2021    Zoster vaccine, recombinant, adult (SHINGRIX) 06/15/2020       Review of Systems  Review of systems is otherwise negative unless stated above or in history of present illness.    Objective   Visit Vitals  /75   Pulse 70   Temp 36.5 °C (97.7 °F)   Ht 1.727 m (5' 8\")   Wt 97.3 kg (214 lb 9.6 oz)   SpO2 99%   BMI 32.63 kg/m²   Smoking Status Former   BSA 2.16 m²     Physical Exam  Constitutional: BMI 32     General: not in acute distress.   HENT: Irritation swelling of the uvula and throat no jaundice     Head: Normocephalic and atraumatic.      Nose: Nose normal.   Eyes:      Extraocular Movements: Extraocular movements intact.      Conjunctiva/sclera: Conjunctivae normal.   Cardiovascular: Heart murmur     Rate and Rhythm: Normal rate ,  No M/R/G  Pulmonary: Rhonchi     Effort: Pulmonary effort is normal.      Breath sounds: Normal, Bilat Equal AE  Skin: Dry skin     General: Skin is warm.   Neurological:      Mental Status: He is alert and oriented to person, place, and time.   Psychiatric:         Mood and Affect: Mood normal.         Behavior: Behavior normal.   Musculoskeletal   FROM in all extremitirs,  Joint-no swelling or tenderness    Orders Only on 12/15/2024   Component Date Value Ref Range Status    [facility] Cardiolipin Ab IgM 12/15/2024 21 (H)  <=12 Final    [facility] Cardiolipin Ab IgG 12/15/2024 >150 (H)  <=14 Final    [facility] [id number] 1, IgM A* 12/15/2024 49 (H)  <=20 Final    [facility] [id number] 1, IgG A* 12/15/2024 111 (H)  <=20 Final    [facility] APC R Reflex to [facility] 12/15/2024 See " Report   Final    [facility] Misc Sendout 12/15/2024 See Report   Final   Orders Only on 12/14/2024   Component Date Value Ref Range Status    [facility] Antithrombin Antigen 12/14/2024 97  82 - 136 % Final    [facility] Antithrombin, enzymatic 12/14/2024 113  76 - 128 % Final   Orders Only on 12/12/2024   Component Date Value Ref Range Status    [facility] Saturation 12/12/2024 22.5  20.0 - 50.0 % Final    [facility] TIBC 12/12/2024 342  250 - 425 ug/ml Final    [facility] IRON 12/12/2024 77  65 - 175 ug/dl Final    [facility] Platelet 12/12/2024 251  150 - 450 x10 Final    [facility] RBC 12/12/2024 5.40  4.70 - 6.10 x10 Final    [facility] Instr WBC 12/12/2024 5.7   Final    [facility] MCHC 12/12/2024 33.6  32.0 - 37.0 g/dL Final    [facility] Nucleated RBC 12/12/2024 0  /100WBC Final    [facility] MCV 12/12/2024 79.6 (L)  80.0 - 100.0 fL Final    [facility] MPV 12/12/2024 7.0 (L)  7.4 - 10.4 fL Final    [facility] HGB 12/12/2024 14.4  13.5 - 17.5 g/dL Final    [facility] WBC 12/12/2024 5.7  4.5 - 11.0 x10 Final    [facility] RDW 12/12/2024 20.2 (H)  11.5 - 14.5 % Final    [facility] DIFF? 12/12/2024 No   Final    [facility] DxH Actions 12/12/2024 See Notes (A)   Final    [facility] MCH 12/12/2024 26.7 (L)  27.0 - 34.0 pg Final    [facility] HCT 12/12/2024 43.0  41.0 - 52.0 % Final    [facility] Eosin % 12/12/2024 1.0  % Final    [facility] Red Blood Cell Morpholo* 12/12/2024 See Notes (A)   Final    [facility] Eos Count 12/12/2024 0.06  0.00 - 0.50 x1000 Final    [facility] Lymph % 12/12/2024 22.4  % Final    [facility] Lymph Count 12/12/2024 1.28  1.20 - 4.80 x1000 Final    [facility] Scan Differential 12/12/2024 Diff Scd   Final    [facility] Basos % 12/12/2024 0.6  % Final    [facility] Mono % 12/12/2024 7.2  % Final    [facility] Baso Count 12/12/2024 0.04  0.00 - 0.20 x1000 Final    [facility] Mono Count 12/12/2024 0.41  0.10 - 1.00 x1000 Final    [facility] Neutrophil % 12/12/2024 68.8  % Final    [facility]  [facility] Neutrophil Count (ANC) 12/12/2024 3.93  1.40 - 8.80 x1000 Final    [facility] GPT 12/12/2024 66 (H)  10 - 49 unit/L Final    [facility] Creatinine 12/12/2024 1.3 (H)  0.6 - 1.1 mg/dL Final    [facility] Total Protein 12/12/2024 7.1  5.7 - 8.2 g/dL Final    [facility] CO2, venous 12/12/2024 24.0  20.0 - 31.0 mmol/L Final    [facility] Alk Phos 12/12/2024 66  45 - 117 unit/L Final    [facility] Glomerular Filtration R* 12/12/2024 57  mL/min/1.73m? Final    [facility] Calculated Osmolality 12/12/2024 283  275 - 295 mOsm/kg Final    [facility] K 12/12/2024 4.4  3.5 - 5.1 mmol/L Final    [facility] Globulin 12/12/2024 3.4  g/dL Final    [facility] BUN 12/12/2024 17  9 - 23 mg/dL Final    [facility] Calcium 12/12/2024 9.5  8.7 - 10.4 mg/dL Final    [facility] GOT 12/12/2024 33  15 - 37 unit/L Final    [facility] Glucose 12/12/2024 100  74 - 106 mg/dL Final    [facility] GFR AA 12/12/2024 >60   Final    [facility] Bilirubin, Total 12/12/2024 0.60  0.30 - 1.20 mg/dL Final    [facility] Chloride 12/12/2024 109 (H)  98 - 107 mmol/L Final    [facility] A/G Ratio 12/12/2024 1.1   Final    [facility] ALB 12/12/2024 3.7  3.4 - 5.0 g/dL Final    [facility] Na 12/12/2024 141  135 - 145 mmol/L Final    [facility] BUN/Creat Ratio 12/12/2024 13.1   Final    [facility] FERRITIN 12/12/2024 115  22 - 322 ng/mL Final    [facility] FOLATE 12/12/2024 33.7 (H)  5.4 - 17.5 ng/mL Final    [facility] Vitamin B12 12/12/2024 628  211 - 911 pg/mL Final       Radiology: Reviewed imaging in powerchart.  CT cardiac scoring wo IV contrast    Result Date: 3/3/2025  Interpreted By:  An Anderson, STUDY: CT CARDIAC SCORING WO IV CONTRAST;  3/2/2025 1:53 pm   INDICATION: Signs/Symptoms:bp.   ,I10 Essential (primary) hypertension   COMPARISON: None.   ACCESSION NUMBER(S): [id number]   ORDERING CLINICIAN: PAUL FERGUSON   TECHNIQUE: Using prospective ECG gating, CT scan of the coronary arteries was performed without intravenous contrast. Coronary  "calcium scoring  was performed according to the method of Agatston.   FINDINGS: The score and distribution of calcium in the coronary arteries is as follows:   LM 0 LAD 0 LCx 0 RCA 3.08   Total 3.08   The visualized mid/lower ascending thoracic aorta measures 3.4 cm in diameter.  The heart is normal in size.  No significant pericardial effusion.   No gross mediastinal or hilar lymphadenopathy in the included areas. No airspace consolidation or pleural effusion in the included areas.Coarse bandlike partially pleural-based opacity in the left lung base.   The visualized subdiaphragmatic structures appear intact.       1. Coronary artery calcium score of 3.08*. 2. Bandlike probable scarring in the left lung base incidentally noted.   *Coronary artery calcium scoring may be helpful in predicting the risk for future coronary heart disease events.  According to the American College of Cardiology Foundation Clinical Expert Consensus Task Force, such testing provides important prognostic information in patients with more than one coronary heart disease risk factor. The coronary artery calcium score correlates with the annual risk of a non-fatal myocardial infarction or coronary heart disease death.   Coronary artery score            Annual Risk   0-99                             0.4% 100-399                        1.3% >400                            2.4%   These three \"breakpoints\" correspond to lower, intermediate and high risk states for future coronary events.  Such information should be used, along with appropriate clinical judgment, to make decisions regarding the intensity of risk factor management strategies to treat blood lipids and to modify other non-lipid coronary risk factors.   Reference: Arab P et al. Circulation.  2007; 115:402-426.   MACRO: None.   Signed by: An Anderson 3/3/2025 11:26 AM Dictation workstation:   [id number]        Charting was completed using voice recognition technology and may " include unintended errors.

## 2025-03-06 ENCOUNTER — TELEPHONE (OUTPATIENT)
Dept: PRIMARY CARE | Facility: CLINIC | Age: 55
End: 2025-03-06
Payer: COMMERCIAL

## 2025-03-06 NOTE — TELEPHONE ENCOUNTER
----- Message from Mark Petersen sent at 3/5/2025 10:08 AM EST -----  BUN 19 creatinine 1.31 GFR 64 CKD stage II cut down salt and protein follow-up

## 2025-03-12 LAB
ALBUMIN SERPL-MCNC: 4.7 G/DL (ref 3.6–5.1)
ALP SERPL-CCNC: 58 U/L (ref 35–144)
ALT SERPL-CCNC: 37 U/L (ref 9–46)
ANION GAP SERPL CALCULATED.4IONS-SCNC: 11 MMOL/L (CALC) (ref 7–17)
AST SERPL-CCNC: 29 U/L (ref 10–35)
BASOPHILS # BLD AUTO: 23 CELLS/UL (ref 0–200)
BASOPHILS NFR BLD AUTO: 0.3 %
BILIRUB SERPL-MCNC: 1.1 MG/DL (ref 0.2–1.2)
BUN SERPL-MCNC: 19 MG/DL (ref 7–25)
CALCIUM SERPL-MCNC: 9.4 MG/DL (ref 8.6–10.3)
CHLORIDE SERPL-SCNC: 100 MMOL/L (ref 98–110)
CO2 SERPL-SCNC: 26 MMOL/L (ref 20–32)
CREAT SERPL-MCNC: 1.31 MG/DL (ref 0.7–1.3)
EGFRCR SERPLBLD CKD-EPI 2021: 64 ML/MIN/1.73M2
EOSINOPHIL # BLD AUTO: 53 CELLS/UL (ref 15–500)
EOSINOPHIL NFR BLD AUTO: 0.7 %
ERYTHROCYTE [DISTWIDTH] IN BLOOD BY AUTOMATED COUNT: 13.1 % (ref 11–15)
GLUCOSE SERPL-MCNC: 96 MG/DL (ref 65–99)
HCT VFR BLD AUTO: 45.1 % (ref 38.5–50)
HGB BLD-MCNC: 15 G/DL (ref 13.2–17.1)
LYMPHOCYTES # BLD AUTO: 1508 CELLS/UL (ref 850–3900)
LYMPHOCYTES NFR BLD AUTO: 20.1 %
MCH RBC QN AUTO: 28.8 PG (ref 27–33)
MCHC RBC AUTO-ENTMCNC: 33.3 G/DL (ref 32–36)
MCV RBC AUTO: 86.6 FL (ref 80–100)
MONOCYTES # BLD AUTO: 510 CELLS/UL (ref 200–950)
MONOCYTES NFR BLD AUTO: 6.8 %
NEUTROPHILS # BLD AUTO: 5408 CELLS/UL (ref 1500–7800)
NEUTROPHILS NFR BLD AUTO: 72.1 %
PLATELET # BLD AUTO: 289 THOUSAND/UL (ref 140–400)
PMV BLD REES-ECKER: 9.1 FL (ref 7.5–12.5)
POTASSIUM SERPL-SCNC: 4.2 MMOL/L (ref 3.5–5.3)
PROT SERPL-MCNC: 7.4 G/DL (ref 6.1–8.1)
RBC # BLD AUTO: 5.21 MILLION/UL (ref 4.2–5.8)
SODIUM SERPL-SCNC: 137 MMOL/L (ref 135–146)
TESTOST FREE SERPL-MCNC: 38.9 PG/ML (ref 35–155)
TESTOST SERPL-MCNC: 251 NG/DL (ref 250–1100)
WBC # BLD AUTO: 7.5 THOUSAND/UL (ref 3.8–10.8)

## 2025-06-06 ENCOUNTER — APPOINTMENT (OUTPATIENT)
Dept: PRIMARY CARE | Facility: CLINIC | Age: 55
End: 2025-06-06
Payer: COMMERCIAL

## 2025-06-06 VITALS
WEIGHT: 217 LBS | BODY MASS INDEX: 32.89 KG/M2 | HEIGHT: 68 IN | TEMPERATURE: 97.2 F | DIASTOLIC BLOOD PRESSURE: 72 MMHG | OXYGEN SATURATION: 99 % | HEART RATE: 58 BPM | SYSTOLIC BLOOD PRESSURE: 121 MMHG

## 2025-06-06 DIAGNOSIS — E78.2 HYPERLIPEMIA, MIXED: ICD-10-CM

## 2025-06-06 DIAGNOSIS — Z85.038 HISTORY OF COLON CANCER, STAGE II: ICD-10-CM

## 2025-06-06 DIAGNOSIS — I82.432 ACUTE DEEP VEIN THROMBOSIS (DVT) OF POPLITEAL VEIN OF LEFT LOWER EXTREMITY: ICD-10-CM

## 2025-06-06 DIAGNOSIS — N18.31 STAGE 3A CHRONIC KIDNEY DISEASE (MULTI): ICD-10-CM

## 2025-06-06 DIAGNOSIS — Z11.59 NEED FOR HEPATITIS C SCREENING TEST: ICD-10-CM

## 2025-06-06 DIAGNOSIS — Z86.711 HISTORY OF PULMONARY EMBOLUS (PE): ICD-10-CM

## 2025-06-06 DIAGNOSIS — I10 BENIGN ESSENTIAL HYPERTENSION: Primary | ICD-10-CM

## 2025-06-06 PROBLEM — T46.4X5A ACE-INHIBITOR COUGH: Status: RESOLVED | Noted: 2025-03-04 | Resolved: 2025-06-06

## 2025-06-06 PROBLEM — Z00.01 ANNUAL VISIT FOR GENERAL ADULT MEDICAL EXAMINATION WITH ABNORMAL FINDINGS: Status: RESOLVED | Noted: 2023-07-14 | Resolved: 2025-06-06

## 2025-06-06 PROBLEM — T78.40XA ALLERGIES: Status: RESOLVED | Noted: 2025-03-04 | Resolved: 2025-06-06

## 2025-06-06 PROBLEM — R05.8 ACE-INHIBITOR COUGH: Status: RESOLVED | Noted: 2025-03-04 | Resolved: 2025-06-06

## 2025-06-06 PROBLEM — D68.61 ANTIPHOSPHOLIPID SYNDROME (MULTI): Status: RESOLVED | Noted: 2025-03-04 | Resolved: 2025-06-06

## 2025-06-06 PROBLEM — R73.9 HYPERGLYCEMIA: Status: RESOLVED | Noted: 2024-11-04 | Resolved: 2025-06-06

## 2025-06-06 PROCEDURE — 3078F DIAST BP <80 MM HG: CPT | Performed by: INTERNAL MEDICINE

## 2025-06-06 PROCEDURE — 3008F BODY MASS INDEX DOCD: CPT | Performed by: INTERNAL MEDICINE

## 2025-06-06 PROCEDURE — 1036F TOBACCO NON-USER: CPT | Performed by: INTERNAL MEDICINE

## 2025-06-06 PROCEDURE — 99214 OFFICE O/P EST MOD 30 MIN: CPT | Performed by: INTERNAL MEDICINE

## 2025-06-06 PROCEDURE — 3074F SYST BP LT 130 MM HG: CPT | Performed by: INTERNAL MEDICINE

## 2025-06-06 ASSESSMENT — PATIENT HEALTH QUESTIONNAIRE - PHQ9
SUM OF ALL RESPONSES TO PHQ9 QUESTIONS 1 AND 2: 0
1. LITTLE INTEREST OR PLEASURE IN DOING THINGS: NOT AT ALL
2. FEELING DOWN, DEPRESSED OR HOPELESS: NOT AT ALL

## 2025-06-06 NOTE — ASSESSMENT & PLAN NOTE
Advised to continue Benicar 5 mg a day plus Crestor 20 mg a day monitor CMP CPK lipid twice a year

## 2025-06-06 NOTE — PROGRESS NOTES
Subjective   Patient ID: Alcon Covarrubias is a 54 y.o. male who presents for Follow-up.    Assessment/Plan     Problem List Items Addressed This Visit       Benign essential hypertension - Primary    Advised to continue Benicar 5 mg a day plus Crestor 20 mg a day monitor CMP CPK lipid twice a year         Relevant Orders    Albumin-Creatinine Ratio, Urine Random    Albumin-Creatinine Ratio, Urine Random    Hemoglobin A1C    Lipid Panel    Magnesium    Uric Acid    Hyperlipemia, mixed    Relevant Orders    Albumin-Creatinine Ratio, Urine Random    Albumin-Creatinine Ratio, Urine Random    Hemoglobin A1C    Lipid Panel    Magnesium    Uric Acid    Need for hepatitis C screening test    Relevant Orders    Hepatitis C antibody    Acute deep vein thrombosis (DVT) of popliteal vein of left lower extremity    Colon surgery antiphospholipid syndrome on anticoagulation follow-up with him at oncologist         History of pulmonary embolus (PE)    Relevant Orders    Albumin-Creatinine Ratio, Urine Random    Albumin-Creatinine Ratio, Urine Random    Hemoglobin A1C    Lipid Panel    Magnesium    Uric Acid    History of colon cancer, stage II    Follow-up with oncology and GI watch for weight loss and change of the stool or color         Stage 3a chronic kidney disease (Multi)    Relevant Orders    Albumin-Creatinine Ratio, Urine Random    Albumin-Creatinine Ratio, Urine Random    Hemoglobin A1C    Lipid Panel    Magnesium    Uric Acid       HPI 54-year-old patient: Cancer Cerovel history antiphospholipid syndrome on anticoagulation seen by GI oncology complaining arthralgia myalgia fatigue tired weakness without any headache chest pain jaundice hematuria rectal bleeding or weight loss    Hypertension hyperlipidemia cancer survival autoimmune disease patient will continue aspirin vitamin D or Keytruda multivitamin Benicar Crestor advised follow-up with the oncology once a year GI every 3 years sent for the blood test follow-up  "recommend not suicidal  Medical History[1]  Surgical History[2]  Allergies[3]  Current Medications[4]  Family History[5]  Social History[6]  Immunization History   Administered Date(s) Administered    Influenza, Unspecified 10/12/2020    Influenza, seasonal, injectable 10/12/2020    Pfizer Purple Cap SARS-CoV-2 03/31/2021, 04/21/2021, 12/11/2021    Zoster vaccine, recombinant, adult (SHINGRIX) 06/15/2020       Review of Systems  Review of systems is otherwise negative unless stated above or in history of present illness.    Objective   Visit Vitals  /72 (BP Location: Left arm, Patient Position: Sitting)   Pulse 58   Temp 36.2 °C (97.2 °F)   Ht 1.727 m (5' 8\")   Wt 98.4 kg (217 lb)   SpO2 99%   BMI 32.99 kg/m²   Smoking Status Former   BSA 2.17 m²     Physical Exam  Constitutional: BMI 32     General: not in acute distress.   HENT:      Head: Normocephalic and atraumatic.      Nose: Nose normal.   Eyes: No jaundice     Extraocular Movements: Extraocular movements intact.      Conjunctiva/sclera: Conjunctivae normal.   Cardiovascular: No heart murmur     Rate and Rhythm: Normal rate ,  No M/R/G  Pulmonary:      Effort: Pulmonary effort is normal.      Breath sounds: Normal, Bilat Equal AE  Skin:     General: Skin is warm.   Neurological:      Mental Status: He is alert and oriented to person, place, and time.   Psychiatric:    Anxiety     Mood and Affect: Mood normal.         Behavior: Behavior normal.   Musculoskeletal   FROM in all extremitirs,  Joint-no swelling or tenderness    Office Visit on 03/04/2025   Component Date Value Ref Range Status    GLUCOSE 03/04/2025 96  65 - 99 mg/dL Final    UREA NITROGEN (BUN) 03/04/2025 19  7 - 25 mg/dL Final    CREATININE 03/04/2025 1.31 (H)  0.70 - 1.30 mg/dL Final    EGFR 03/04/2025 64  > OR = 60 mL/min/1.73m2 Final    SODIUM 03/04/2025 137  135 - 146 mmol/L Final    POTASSIUM 03/04/2025 4.2  3.5 - 5.3 mmol/L Final    CHLORIDE 03/04/2025 100  98 - 110 mmol/L Final    " CARBON DIOXIDE 03/04/2025 26  20 - 32 mmol/L Final    ELECTROLYTE BALANCE 03/04/2025 11  7 - 17 mmol/L (calc) Final    CALCIUM 03/04/2025 9.4  8.6 - 10.3 mg/dL Final    PROTEIN, TOTAL 03/04/2025 7.4  6.1 - 8.1 g/dL Final    ALBUMIN 03/04/2025 4.7  3.6 - 5.1 g/dL Final    BILIRUBIN, TOTAL 03/04/2025 1.1  0.2 - 1.2 mg/dL Final    ALKALINE PHOSPHATASE 03/04/2025 58  35 - 144 U/L Final    AST 03/04/2025 29  10 - 35 U/L Final    ALT 03/04/2025 37  9 - 46 U/L Final    WHITE BLOOD CELL COUNT 03/04/2025 7.5  3.8 - 10.8 Thousand/uL Final    RED BLOOD CELL COUNT 03/04/2025 5.21  4.20 - 5.80 Million/uL Final    HEMOGLOBIN 03/04/2025 15.0  13.2 - 17.1 g/dL Final    HEMATOCRIT 03/04/2025 45.1  38.5 - 50.0 % Final    MCV 03/04/2025 86.6  80.0 - 100.0 fL Final    MCH 03/04/2025 28.8  27.0 - 33.0 pg Final    MCHC 03/04/2025 33.3  32.0 - 36.0 g/dL Final    RDW 03/04/2025 13.1  11.0 - 15.0 % Final    PLATELET COUNT 03/04/2025 289  140 - 400 Thousand/uL Final    MPV 03/04/2025 9.1  7.5 - 12.5 fL Final    ABSOLUTE NEUTROPHILS 03/04/2025 5,408  1,500 - 7,800 cells/uL Final    ABSOLUTE LYMPHOCYTES 03/04/2025 1,508  850 - 3,900 cells/uL Final    ABSOLUTE MONOCYTES 03/04/2025 510  200 - 950 cells/uL Final    ABSOLUTE EOSINOPHILS 03/04/2025 53  15 - 500 cells/uL Final    ABSOLUTE BASOPHILS 03/04/2025 23  0 - 200 cells/uL Final    NEUTROPHILS 03/04/2025 72.1  % Final    LYMPHOCYTES 03/04/2025 20.1  % Final    MONOCYTES 03/04/2025 6.8  % Final    EOSINOPHILS 03/04/2025 0.7  % Final    BASOPHILS 03/04/2025 0.3  % Final    TESTOSTERONE, TOTAL, MS 03/04/2025 251  250 - 1,100 ng/dL Final    TESTOSTERONE, FREE 03/04/2025 38.9  35.0 - 155.0 pg/mL Final       Radiology: Reviewed imaging in powerchart.  Imaging  No results found.    Cardiology, Vascular, and Other Imaging  No other imaging results found for the past 7 days        Charting was completed using voice recognition technology and may include unintended errors.            [1]   Past Medical  History:  Diagnosis Date    Abnormal levels of other serum enzymes 06/19/2018    Elevated CPK    Body mass index (BMI) 32.0-32.9, adult 03/17/2022    BMI 32.0-32.9,adult    Contact with and (suspected) exposure to covid-19 12/03/2020    COVID-19 ruled out by laboratory testing    COVID-19 07/12/2021    Lab test positive for detection of COVID-19 virus    Encounter for screening for malignant neoplasm of colon 08/25/2022    Screen for colon cancer    Encounter for screening for malignant neoplasm of rectum 08/25/2022    Screening for rectal cancer    Encounter for screening for malignant neoplasm of respiratory organs 08/25/2022    Encounter for screening for lung cancer    Other chest pain 05/16/2019    Atypical chest pain    Other long term (current) drug therapy 06/15/2020    Long term use of drug    Otitis media, unspecified, unspecified ear 07/08/2015    Ear infection    Pain in left shoulder 02/26/2018    Chronic left shoulder pain    Periapical abscess without sinus 03/13/2020    Tooth infection    Personal history of malignant melanoma of skin     History of malignant melanoma of skin    Personal history of other diseases of the musculoskeletal system and connective tissue 01/19/2015    History of low back pain    Personal history of other endocrine, nutritional and metabolic disease 06/15/2020    History of vitamin D deficiency    Personal history of other endocrine, nutritional and metabolic disease 06/15/2020    History of morbid obesity    Personal history of other infectious and parasitic diseases 08/25/2022    History of herpes labialis    Personal history of urinary (tract) infections 07/16/2019    History of urinary tract infection   [2]   Past Surgical History:  Procedure Laterality Date    APPENDECTOMY  07/08/2014    Appendectomy    MOUTH SURGERY      tooth implant   [3] No Known Allergies  [4]   Current Outpatient Medications   Medication Sig Dispense Refill    aspirin 81 mg EC tablet Take 1 tablet  (81 mg) by mouth once daily.      cholecalciferol (Vitamin D-3) 25 MCG (1000 UT) tablet Vitamin D3 25 MCG (1000 UT) Oral Tablet   Refills: 0        [facility] GREGG, Mark   Start : 1-Feb-2021   Active      fondaparinux (Arixtra) 7.5 mg/0.6 mL syringe INJECT 0.6 ML UNDER THE SKIN DAILY FOR 30 DAYS      multivitamin tablet Take 1 tablet by mouth once daily.      olmesartan (Benicar) 5 mg tablet Take 1 tablet (5 mg) by mouth once daily. 90 tablet 1    rosuvastatin (Crestor) 20 mg tablet TAKE 1 TABLET (20 MG) BY MOUTH ONCE DAILY. IN THE EVENING 90 tablet 3     No current facility-administered medications for this visit.   [5]   Family History  Problem Relation Name Age of Onset    No Known Problems Mother      Liver cancer Father      Kidney failure Father      Other (cardiac disorder) Father     [6]   Social History  Socioeconomic History    Marital status:    Tobacco Use    Smoking status: Former     Types: Cigarettes    Smokeless tobacco: Never   Substance and Sexual Activity    Alcohol use: Not Currently    Drug use: Never     Social Drivers of Health     Food Insecurity: No Food Insecurity (7/26/2024)    Received from [facility]    Hunger Vital Sign     Worried About Running Out of Food in the Last Year: Never true     Ran Out of Food in the Last Year: Never true   Transportation Needs: No Transportation Needs (7/26/2024)    Received from [facility]    PRALEONCIOE - Transportation     Lack of Transportation (Medical): No     Lack of Transportation (Non-Medical): No

## 2025-06-07 LAB
ALBUMIN/CREAT UR: NORMAL MG/G CREAT
CHOLEST SERPL-MCNC: 137 MG/DL
CHOLEST/HDLC SERPL: 3 (CALC)
CREAT UR-MCNC: 60 MG/DL (ref 20–320)
EST. AVERAGE GLUCOSE BLD GHB EST-MCNC: 114 MG/DL
EST. AVERAGE GLUCOSE BLD GHB EST-SCNC: 6.3 MMOL/L
HBA1C MFR BLD: 5.6 %
HCV AB SERPL QL IA: NORMAL
HDLC SERPL-MCNC: 45 MG/DL
LDLC SERPL CALC-MCNC: 74 MG/DL (CALC)
MAGNESIUM SERPL-MCNC: 2.3 MG/DL (ref 1.5–2.5)
MICROALBUMIN UR-MCNC: <0.2 MG/DL
NONHDLC SERPL-MCNC: 92 MG/DL (CALC)
TRIGL SERPL-MCNC: 92 MG/DL
URATE SERPL-MCNC: 7 MG/DL (ref 4–8)

## 2025-07-29 ENCOUNTER — APPOINTMENT (OUTPATIENT)
Dept: PRIMARY CARE | Facility: CLINIC | Age: 55
End: 2025-07-29
Payer: COMMERCIAL

## 2025-07-29 VITALS
WEIGHT: 219 LBS | SYSTOLIC BLOOD PRESSURE: 109 MMHG | HEART RATE: 71 BPM | HEIGHT: 68 IN | BODY MASS INDEX: 33.19 KG/M2 | TEMPERATURE: 97.3 F | OXYGEN SATURATION: 97 % | DIASTOLIC BLOOD PRESSURE: 69 MMHG

## 2025-07-29 DIAGNOSIS — N18.31 STAGE 3A CHRONIC KIDNEY DISEASE (MULTI): ICD-10-CM

## 2025-07-29 DIAGNOSIS — C18.9 MALIGNANT NEOPLASM OF COLON, UNSPECIFIED PART OF COLON (MULTI): ICD-10-CM

## 2025-07-29 DIAGNOSIS — Z86.711 HISTORY OF PULMONARY EMBOLUS (PE): ICD-10-CM

## 2025-07-29 DIAGNOSIS — I10 BENIGN ESSENTIAL HYPERTENSION: ICD-10-CM

## 2025-07-29 DIAGNOSIS — I82.432 ACUTE DEEP VEIN THROMBOSIS (DVT) OF POPLITEAL VEIN OF LEFT LOWER EXTREMITY: ICD-10-CM

## 2025-07-29 DIAGNOSIS — N52.9 ERECTILE DYSFUNCTION, UNSPECIFIED ERECTILE DYSFUNCTION TYPE: ICD-10-CM

## 2025-07-29 DIAGNOSIS — Z85.038 HISTORY OF COLON CANCER, STAGE II: ICD-10-CM

## 2025-07-29 DIAGNOSIS — R05.9 COUGH, UNSPECIFIED TYPE: ICD-10-CM

## 2025-07-29 DIAGNOSIS — R21 SKIN RASH: ICD-10-CM

## 2025-07-29 DIAGNOSIS — E78.2 HYPERLIPEMIA, MIXED: ICD-10-CM

## 2025-07-29 DIAGNOSIS — Z00.01 ANNUAL VISIT FOR GENERAL ADULT MEDICAL EXAMINATION WITH ABNORMAL FINDINGS: Primary | ICD-10-CM

## 2025-07-29 PROBLEM — Z11.59 NEED FOR HEPATITIS C SCREENING TEST: Status: RESOLVED | Noted: 2024-03-21 | Resolved: 2025-07-29

## 2025-07-29 PROCEDURE — 3008F BODY MASS INDEX DOCD: CPT | Performed by: INTERNAL MEDICINE

## 2025-07-29 PROCEDURE — 99214 OFFICE O/P EST MOD 30 MIN: CPT | Performed by: INTERNAL MEDICINE

## 2025-07-29 PROCEDURE — 3074F SYST BP LT 130 MM HG: CPT | Performed by: INTERNAL MEDICINE

## 2025-07-29 PROCEDURE — 99396 PREV VISIT EST AGE 40-64: CPT | Performed by: INTERNAL MEDICINE

## 2025-07-29 PROCEDURE — 3078F DIAST BP <80 MM HG: CPT | Performed by: INTERNAL MEDICINE

## 2025-07-29 RX ORDER — FLUCONAZOLE 100 MG/1
100 TABLET ORAL DAILY
Qty: 3 TABLET | Refills: 0 | Status: SHIPPED | OUTPATIENT
Start: 2025-07-29 | End: 2025-08-01

## 2025-07-29 RX ORDER — SILDENAFIL 100 MG/1
100 TABLET, FILM COATED ORAL DAILY PRN
Qty: 12 TABLET | Refills: 3 | Status: SHIPPED | OUTPATIENT
Start: 2025-07-29 | End: 2026-07-29

## 2025-07-29 RX ORDER — CLOTRIMAZOLE 1 %
CREAM (GRAM) TOPICAL 3 TIMES DAILY
Qty: 28 G | Refills: 0 | Status: SHIPPED | OUTPATIENT
Start: 2025-07-29

## 2025-07-29 NOTE — PROGRESS NOTES
Subjective   Patient ID: Alcon Covarrubias is a 55 y.o. male who presents for Annual Exam.    Assessment/Plan     Problem List Items Addressed This Visit       Benign essential hypertension    Relevant Orders    CBC and Auto Differential    Comprehensive Metabolic Panel    Magnesium    TSH with reflex to Free T4 if abnormal    Prostate Specific Antigen, Screen    Hyperlipemia, mixed    Relevant Orders    CBC and Auto Differential    Comprehensive Metabolic Panel    Magnesium    TSH with reflex to Free T4 if abnormal    Prostate Specific Antigen, Screen    Annual visit for general adult medical examination with abnormal findings - Primary    Acute deep vein thrombosis (DVT) of popliteal vein of left lower extremity    Relevant Orders    CBC and Auto Differential    Comprehensive Metabolic Panel    Magnesium    TSH with reflex to Free T4 if abnormal    Prostate Specific Antigen, Screen    History of pulmonary embolus (PE)    Relevant Orders    CBC and Auto Differential    Comprehensive Metabolic Panel    Magnesium    TSH with reflex to Free T4 if abnormal    Prostate Specific Antigen, Screen    History of colon cancer, stage II    Relevant Orders    CBC and Auto Differential    Comprehensive Metabolic Panel    Magnesium    TSH with reflex to Free T4 if abnormal    Prostate Specific Antigen, Screen    Malignant neoplasm of colon, unspecified part of colon (Multi)    Relevant Orders    CBC and Auto Differential    Comprehensive Metabolic Panel    Magnesium    TSH with reflex to Free T4 if abnormal    Prostate Specific Antigen, Screen    Stage 3a chronic kidney disease (Multi)    Relevant Orders    CBC and Auto Differential    Comprehensive Metabolic Panel    Magnesium    TSH with reflex to Free T4 if abnormal    Prostate Specific Antigen, Screen    Skin rash    Given Lotrimin and Diflucan advised not to use any soaps help with hot water for 2 weeks         Erectile dysfunction     Other Visit Diagnoses         Cough,  "unspecified type        Relevant Orders    XR chest 2 views            HPI 55-year-old patient  with 2 children    Brother  at 40 from heart attack    Sister obesity    Mother obesity    Father have a colon cancer  at 64    Negative for double gallbladder    History of colon cancer stage II status post surgery    Personal history of hypertension hyperlipidemia obesity complaining the erectile dysfunction    Also had a skin rash in both axillae    Negative for night sweat weight loss polyuria polydipsia hematuria rectal bleeding constipation change of bowel habit or jaundice    Negative for anxiety depression dementia or fall  Medical History[1]  Surgical History[2]  Allergies[3]  Current Medications[4]  Family History[5]  Social History[6]  Immunization History   Administered Date(s) Administered    Influenza, Unspecified 10/12/2020    Influenza, seasonal, injectable 10/12/2020    Pfizer Purple Cap SARS-CoV-2 2021, 2021, 2021    Zoster vaccine, recombinant, adult (SHINGRIX) 06/15/2020       Review of Systems  Review of systems is otherwise negative unless stated above or in history of present illness.    Objective   Visit Vitals  /69 (BP Location: Left arm, Patient Position: Sitting)   Pulse 71   Temp 36.3 °C (97.3 °F)   Ht 1.727 m (5' 8\")   Wt 99.3 kg (219 lb)   SpO2 97%   BMI 33.30 kg/m²   Smoking Status Former   BSA 2.18 m²     Physical Exam  Constitutional: BMI 33     General: not in acute distress.   HENT:      Head: Normocephalic and atraumatic.      Nose: Nose normal.   Eyes: No jaundice     Extraocular Movements: Extraocular movements intact.      Conjunctiva/sclera: Conjunctivae normal.   Cardiovascular:      Rate and Rhythm: Normal rate ,  No M/R/G  Pulmonary:      Effort: Pulmonary effort is normal.      Breath sounds: Normal, Bilat Equal AE  Skin: Skin rash axilla     General: Skin is warm.   Neurological: Neuralgia     Mental Status: He is alert and oriented to " person, place, and time.   Psychiatric:    Anxiety about his sex life     Mood and Affect: Mood normal.         Behavior: Behavior normal.   Musculoskeletal   FROM in all extremitirs,  Joint-no swelling or tenderness    Office Visit on 06/06/2025   Component Date Value Ref Range Status    HEPATITIS C ANTIBODY 06/06/2025 NON-REACTIVE  NON-REACTIVE Final    CREATININE, RANDOM URINE 06/06/2025 60  20 - 320 mg/dL Final    ALBUMIN, URINE 06/06/2025 <0.2  See Note: mg/dL Final    ALBUMIN/CREATININE RATIO, RANDOM U* 06/06/2025 NOTE  <30 mg/g creat Final    HEMOGLOBIN A1c 06/06/2025 5.6  <5.7 % Final    eAG (mg/dL) 06/06/2025 114  mg/dL Final    eAG (mmol/L) 06/06/2025 6.3  mmol/L Final    CHOLESTEROL, TOTAL 06/06/2025 137  <200 mg/dL Final    HDL CHOLESTEROL 06/06/2025 45  > OR = 40 mg/dL Final    TRIGLYCERIDES 06/06/2025 92  <150 mg/dL Final    LDL-CHOLESTEROL 06/06/2025 74  mg/dL (calc) Final    CHOL/HDLC RATIO 06/06/2025 3.0  <5.0 (calc) Final    NON HDL CHOLESTEROL 06/06/2025 92  <130 mg/dL (calc) Final    MAGNESIUM 06/06/2025 2.3  1.5 - 2.5 mg/dL Final    URIC ACID 06/06/2025 7.0  4.0 - 8.0 mg/dL Final       Radiology: Reviewed imaging in powerchart.  Imaging  No results found.    Cardiology, Vascular, and Other Imaging  No other imaging results found for the past 7 days        Charting was completed using voice recognition technology and may include unintended errors.            [1]   Past Medical History:  Diagnosis Date    Abnormal levels of other serum enzymes 06/19/2018    Elevated CPK    Body mass index (BMI) 32.0-32.9, adult 03/17/2022    BMI 32.0-32.9,adult    Contact with and (suspected) exposure to covid-19 12/03/2020    COVID-19 ruled out by laboratory testing    COVID-19 07/12/2021    Lab test positive for detection of COVID-19 virus    Encounter for screening for malignant neoplasm of colon 08/25/2022    Screen for colon cancer    Encounter for screening for malignant neoplasm of rectum 08/25/2022     Screening for rectal cancer    Encounter for screening for malignant neoplasm of respiratory organs 08/25/2022    Encounter for screening for lung cancer    Other chest pain 05/16/2019    Atypical chest pain    Other long term (current) drug therapy 06/15/2020    Long term use of drug    Otitis media, unspecified, unspecified ear 07/08/2015    Ear infection    Pain in left shoulder 02/26/2018    Chronic left shoulder pain    Periapical abscess without sinus 03/13/2020    Tooth infection    Personal history of malignant melanoma of skin     History of malignant melanoma of skin    Personal history of other diseases of the musculoskeletal system and connective tissue 01/19/2015    History of low back pain    Personal history of other endocrine, nutritional and metabolic disease 06/15/2020    History of vitamin D deficiency    Personal history of other endocrine, nutritional and metabolic disease 06/15/2020    History of morbid obesity    Personal history of other infectious and parasitic diseases 08/25/2022    History of herpes labialis    Personal history of urinary (tract) infections 07/16/2019    History of urinary tract infection   [2]   Past Surgical History:  Procedure Laterality Date    APPENDECTOMY  07/08/2014    Appendectomy    MOUTH SURGERY      tooth implant   [3] No Known Allergies  [4]   Current Outpatient Medications   Medication Sig Dispense Refill    aspirin 81 mg EC tablet Take 1 tablet (81 mg) by mouth once daily.      cholecalciferol (Vitamin D-3) 25 MCG (1000 UT) tablet Vitamin D3 25 MCG (1000 UT) Oral Tablet   Refills: 0        Mark Petersen MD   Start : 1-Feb-2021   Active      fondaparinux (Arixtra) 7.5 mg/0.6 mL syringe INJECT 0.6 ML UNDER THE SKIN DAILY FOR 30 DAYS      multivitamin tablet Take 1 tablet by mouth once daily.      olmesartan (Benicar) 5 mg tablet Take 1 tablet (5 mg) by mouth once daily. 90 tablet 1    rosuvastatin (Crestor) 20 mg tablet TAKE 1 TABLET (20 MG) BY MOUTH ONCE  DAILY. IN THE EVENING 90 tablet 3     No current facility-administered medications for this visit.   [5]   Family History  Problem Relation Name Age of Onset    No Known Problems Mother      Liver cancer Father      Kidney failure Father      Other (cardiac disorder) Father     [6]   Social History  Socioeconomic History    Marital status:    Tobacco Use    Smoking status: Former     Types: Cigarettes    Smokeless tobacco: Never   Substance and Sexual Activity    Alcohol use: Not Currently    Drug use: Never     Social Drivers of Health     Food Insecurity: No Food Insecurity (7/26/2024)    Received from [facility]    Hunger Vital Sign     Within the past 12 months, you worried that your food would run out before you got the money to buy more.: Never true     Within the past 12 months, the food you bought just didn't last and you didn't have money to get more.: Never true   Transportation Needs: No Transportation Needs (7/26/2024)    Received from [facility]    PRAPARE - Transportation     Lack of Transportation (Medical): No     Lack of Transportation (Non-Medical): No

## 2025-07-30 LAB
ALBUMIN SERPL-MCNC: 4.4 G/DL (ref 3.6–5.1)
ALP SERPL-CCNC: 54 U/L (ref 35–144)
ALT SERPL-CCNC: 28 U/L (ref 9–46)
ANION GAP SERPL CALCULATED.4IONS-SCNC: 7 MMOL/L (CALC) (ref 7–17)
AST SERPL-CCNC: 23 U/L (ref 10–35)
BASOPHILS # BLD AUTO: 58 CELLS/UL (ref 0–200)
BASOPHILS NFR BLD AUTO: 0.8 %
BILIRUB SERPL-MCNC: 0.8 MG/DL (ref 0.2–1.2)
BUN SERPL-MCNC: 14 MG/DL (ref 7–25)
CALCIUM SERPL-MCNC: 9.1 MG/DL (ref 8.6–10.3)
CHLORIDE SERPL-SCNC: 106 MMOL/L (ref 98–110)
CO2 SERPL-SCNC: 25 MMOL/L (ref 20–32)
CREAT SERPL-MCNC: 1.22 MG/DL (ref 0.7–1.3)
EGFRCR SERPLBLD CKD-EPI 2021: 70 ML/MIN/1.73M2
EOSINOPHIL # BLD AUTO: 79 CELLS/UL (ref 15–500)
EOSINOPHIL NFR BLD AUTO: 1.1 %
ERYTHROCYTE [DISTWIDTH] IN BLOOD BY AUTOMATED COUNT: 12.5 % (ref 11–15)
GLUCOSE SERPL-MCNC: 101 MG/DL (ref 65–99)
HCT VFR BLD AUTO: 45.8 % (ref 38.5–50)
HGB BLD-MCNC: 14.9 G/DL (ref 13.2–17.1)
LYMPHOCYTES # BLD AUTO: 1368 CELLS/UL (ref 850–3900)
LYMPHOCYTES NFR BLD AUTO: 19 %
MAGNESIUM SERPL-MCNC: 2.2 MG/DL (ref 1.5–2.5)
MCH RBC QN AUTO: 29.7 PG (ref 27–33)
MCHC RBC AUTO-ENTMCNC: 32.5 G/DL (ref 32–36)
MCV RBC AUTO: 91.2 FL (ref 80–100)
MONOCYTES # BLD AUTO: 562 CELLS/UL (ref 200–950)
MONOCYTES NFR BLD AUTO: 7.8 %
NEUTROPHILS # BLD AUTO: 5134 CELLS/UL (ref 1500–7800)
NEUTROPHILS NFR BLD AUTO: 71.3 %
PLATELET # BLD AUTO: 286 THOUSAND/UL (ref 140–400)
PMV BLD REES-ECKER: 9 FL (ref 7.5–12.5)
POTASSIUM SERPL-SCNC: 4.1 MMOL/L (ref 3.5–5.3)
PROT SERPL-MCNC: 6.8 G/DL (ref 6.1–8.1)
PSA SERPL-MCNC: 0.47 NG/ML
RBC # BLD AUTO: 5.02 MILLION/UL (ref 4.2–5.8)
SODIUM SERPL-SCNC: 138 MMOL/L (ref 135–146)
TSH SERPL-ACNC: 2.07 MIU/L (ref 0.4–4.5)
WBC # BLD AUTO: 7.2 THOUSAND/UL (ref 3.8–10.8)

## 2025-07-31 ENCOUNTER — RESULTS FOLLOW-UP (OUTPATIENT)
Dept: PRIMARY CARE | Facility: CLINIC | Age: 55
End: 2025-07-31
Payer: COMMERCIAL

## 2025-07-31 NOTE — TELEPHONE ENCOUNTER
----- Message from Mark Petersen sent at 7/31/2025 10:47 AM EDT -----  Glucose 101 advise low-carb diet  ----- Message -----  From: Hansel FIELDS [place] Results In  Sent: 7/30/2025   7:27 AM EDT  To: Mark Petersen MD

## 2025-08-17 DIAGNOSIS — I10 BENIGN ESSENTIAL HYPERTENSION: ICD-10-CM

## 2025-08-18 RX ORDER — OLMESARTAN MEDOXOMIL 5 MG/1
5 TABLET, FILM COATED ORAL DAILY
Qty: 90 TABLET | Refills: 3 | Status: SHIPPED | OUTPATIENT
Start: 2025-08-18

## 2025-11-21 ENCOUNTER — APPOINTMENT (OUTPATIENT)
Dept: PRIMARY CARE | Facility: CLINIC | Age: 55
End: 2025-11-21
Payer: COMMERCIAL